# Patient Record
Sex: FEMALE | Race: WHITE | Employment: OTHER | URBAN - NONMETROPOLITAN AREA
[De-identification: names, ages, dates, MRNs, and addresses within clinical notes are randomized per-mention and may not be internally consistent; named-entity substitution may affect disease eponyms.]

---

## 2019-05-15 ENCOUNTER — HOSPITAL ENCOUNTER (OUTPATIENT)
Dept: WOMENS IMAGING | Age: 74
Discharge: HOME | End: 2019-05-15

## 2019-05-15 DIAGNOSIS — Z00.6 ENCOUNTER FOR EXAMINATION FOR NORMAL COMPARISON OR CONTROL IN CLINICAL RESEARCH PROGRAM: ICD-10-CM

## 2019-05-15 PROCEDURE — 3209999900 MAM COMPARISON OF OUTSIDE IMAGES

## 2019-05-20 ENCOUNTER — HOSPITAL ENCOUNTER (OUTPATIENT)
Dept: WOMENS IMAGING | Age: 74
Discharge: HOME | End: 2019-05-20

## 2019-05-20 DIAGNOSIS — Z00.6 ENCOUNTER FOR EXAMINATION FOR NORMAL COMPARISON OR CONTROL IN CLINICAL RESEARCH PROGRAM: ICD-10-CM

## 2019-05-20 PROCEDURE — 3209999900 MAM COMPARISON OF OUTSIDE IMAGES

## 2019-05-23 ENCOUNTER — HOSPITAL ENCOUNTER (OUTPATIENT)
Dept: WOMENS IMAGING | Age: 74
Discharge: HOME | End: 2019-05-23
Payer: MEDICARE

## 2019-05-23 DIAGNOSIS — N63.0 BREAST MASS: ICD-10-CM

## 2019-05-23 DIAGNOSIS — R92.2 BREAST DENSITY: ICD-10-CM

## 2019-05-23 PROCEDURE — 88305 TISSUE EXAM BY PATHOLOGIST: CPT

## 2019-05-23 PROCEDURE — 2709999900 HC NON-CHARGEABLE SUPPLY

## 2019-05-23 PROCEDURE — 76642 ULTRASOUND BREAST LIMITED: CPT

## 2019-05-23 PROCEDURE — A4648 IMPLANTABLE TISSUE MARKER: HCPCS

## 2019-05-23 PROCEDURE — C1894 INTRO/SHEATH, NON-LASER: HCPCS

## 2019-05-23 PROCEDURE — 88360 TUMOR IMMUNOHISTOCHEM/MANUAL: CPT

## 2019-05-23 PROCEDURE — 19084 BX BREAST ADD LESION US IMAG: CPT

## 2019-05-23 PROCEDURE — 19083 BX BREAST 1ST LESION US IMAG: CPT

## 2019-05-23 PROCEDURE — 88341 IMHCHEM/IMCYTCHM EA ADD ANTB: CPT

## 2019-05-23 PROCEDURE — 88377 M/PHMTRC ALYS ISHQUANT/SEMIQ: CPT

## 2019-05-23 PROCEDURE — 77066 DX MAMMO INCL CAD BI: CPT

## 2019-05-23 PROCEDURE — 88342 IMHCHEM/IMCYTCHM 1ST ANTB: CPT

## 2019-05-23 RX ORDER — MONTELUKAST SODIUM 10 MG/1
10 TABLET ORAL NIGHTLY
COMMUNITY
End: 2021-06-24 | Stop reason: ALTCHOICE

## 2019-05-23 RX ORDER — AMLODIPINE BESYLATE 5 MG/1
7.5 TABLET ORAL DAILY
COMMUNITY

## 2019-05-23 RX ORDER — FEXOFENADINE HCL 180 MG/1
180 TABLET ORAL DAILY
COMMUNITY
End: 2020-12-17

## 2019-05-23 RX ORDER — AZELASTINE 1 MG/ML
1 SPRAY, METERED NASAL 2 TIMES DAILY
COMMUNITY

## 2019-05-23 RX ORDER — M-VIT,TX,IRON,MINS/CALC/FOLIC 27MG-0.4MG
1 TABLET ORAL DAILY
COMMUNITY

## 2019-05-23 RX ORDER — METOPROLOL SUCCINATE 50 MG/1
50 TABLET, EXTENDED RELEASE ORAL DAILY
COMMUNITY

## 2019-05-23 RX ORDER — FAMOTIDINE 20 MG/1
20 TABLET, FILM COATED ORAL 2 TIMES DAILY
COMMUNITY

## 2019-05-23 NOTE — PROGRESS NOTES
[facility] [address]  Pre-Biopsy Assessment      Patient Education    Written information about procedure Yes  left   Procedural steps explained Yes Ultrasound Biopsy   Post-op potential: bruising, hematoma, pain Yes    Self-care: activity, care of dressing Yes    Patient verbalized understanding Yes    Consent signed and witnessed Yes      Hormone Therapy Status:  none    Recent Medication: N/A Last Dose: na                                     Hormone Replacement Therapy: Yes, took for 3 years with menopause.   Stopped at age 53    Previous Breast Biopsy: no    Previous Diagnosis Cancer: no    Hysterectomy:no    Emotional Status: Nervous    Language or Physical Barriers: none    Comments: none      Electronically signed by Carmen Forrest RN on 5/23/2019 at 1:09 PM

## 2019-05-23 NOTE — PROGRESS NOTES
Breast Biopsy Flowsheet/Post-Operative Care    Date of Procedure: 5/23/2019  Physician: Dr. Chin Reese  Technologist: Kim Mera RT(R)(M)    Biopsy:ultrasound guided breast biopsy  Lesion type: Non-palpable  Breast: bilateral    Clock face position: Site #1: Left breast lower outer quadrant     Site #2: Left breast upper outer quadrant, middle depth     Site #3 Right breast lower outer quadrant, middle depth    Primary Method of Detection: Ultrasound      Microcalcification's: no   Distribution: N/A    Asymmetry: NA    Biopsy Method:   Sertera:    Site # 1    Gauge: 14    # of Passes: 4     Clip: Mason    Sertera:    Site # 2    Gauge: 14    # of Passes: 4     Clip:  Dara Goodell:    Site # 3    Gauge: 14    # of Passes: 4     Clip: Jerel    Pre-Op Assessment: (BI-RADS)   4. Suspicious Abnormality    Patient Tolerated Procedure: good  Complications: none  Comments: Patient anxious and emotional throughout procedure     Post Operative Care  Steri strips: Yes  Dressing: Gauze, Tape   Ice Applied to Site:  Yes  Evidence of Bleeding:  No    Pain Verbalized: Yes      Written Discharge Instructions: Yes  Condition at Discharge: good  Time of Discharge: [address] [place] [address]    Electronically signed by Karen Vila RN on 5/23/2019 at [phone] PM

## 2019-05-28 ENCOUNTER — CLINICAL DOCUMENTATION (OUTPATIENT)
Dept: WOMENS IMAGING | Age: 74
End: 2019-05-28

## 2019-05-28 NOTE — PROGRESS NOTES
Name: Ailyn Norman  : [birth year]  MRN: [id number]    Oncology Navigation- Initial Note:    Intake-  Contact [facility]: [facility]    Diagnosis: Breast-malignant    Home Disposition: Lives with other who is able to assist, spouse    Patient needs and barriers to care: Coordination of Care, Knowledge deficit and Emotional Issues/ Fear/ Anxiety     Referral Source: Outside Provider    Receptive to Advanced Care Planning/ Palliative Care:  N/A, not clinical stage 3 or 4    Interventions-   General Interventions: Arrived with spouse for pathology results. Dr. Faina Amaya explained results and recommended she attend Breast Clinic 2019. Education/Screenings:  yes - Breast cancer information packet, navigation, breast clinic, arm measurements     Currently on HRT: no     Referrals: Breast Clinic: 2019 at 7:30 am, okay with Iris Whitehead contacting her for support. Biopsy site status: Faint bruising at left sites x 2, right site superficial skin tear, dime size, no drainage. Instructed to use triple antibiotic as needed. Some tenderness on left and instructed on ice or heat, whichever gives most comfort. Verbalizes understanding. Continuum of Care: Diagnosis/Active Treatment    Notes: Teaching completed and verbalizes understanding. Packet given for home. Does not meet criteria for genetic evaluation. Anxious at times, emotional support offered. All questions answered. States she does not know any surgeons here, all cards given. Will continue to follow through continuum of care.      Electronically signed by Christopher Medellin RN on 2019 at 2:05 PM

## 2019-05-31 ENCOUNTER — CLINICAL DOCUMENTATION (OUTPATIENT)
Dept: WOMENS IMAGING | Age: 74
End: 2019-05-31

## 2019-05-31 ENCOUNTER — CLINICAL DOCUMENTATION (OUTPATIENT)
Dept: PHYSICAL THERAPY | Age: 74
End: 2019-05-31

## 2019-05-31 NOTE — PROGRESS NOTES
Name: Carolyn Willis  : 1945  MRN: [id number]    Oncology Navigation Follow-Up Note    Contact Type:  Breast Clinic    Subjective: Arrived with spouse Jass Knows to meet with Breast Team. Recommendation sheet located under media tab. Objective: Breast Team discussed recommendations and answered questions. Patient appears nervous and expressed that she just \"wants this done as soon as possible. \" Requests referrals to Dr. Kenia Mendiola and Dr. Reema Alexander, wants appointments in [facility] when possible. Okay to be seen in [facility] while Dr. Reema Alexander is off as needed. Assistance Needed: Denies needs at this time. Receptive to Advanced Care Planning / Palliative Care:  N/A, clinical stage 1A     Referrals: Dr. Kenia Mendiola: 6/3/2019 at 9:30 am per Jaclyn Cabrera, . Referrals called to Anastacia Ortega,  for Dr. Reema Alexander in [facility]. Office to call patient with appointment. Patient is aware. Dr. Kenia Mendiola will refer to XRT when appropiate. Education: Breast Clinic Recommendations sheet, reviewed with patient and copy given for home. Care Coordination    Notes: Teaching completed and verbalizes understanding. Emotional support offered. Encouraged her to call anytime with questions. Arm measurements completed by Jay Guerrero, PT assistant. Will continue to follow through continuum of care.     Electronically signed by Kasey Knox RN on 2019 at 8:07 AM

## 2019-05-31 NOTE — PROGRESS NOTES
[facility]  OUTPATIENT REHABILITATION  PHYSICAL THERAPY  INTEGRATED BREAST CANCER CLINIC SCREEN      Date: 2019  Patient Name: Elliot Cardona        CSN: [id number]   : 1945  (75 y.o.)  Gender: female     Location Left   Type IDC   Hormone Type ER+  MA+   HER2-steven  Non-Amplified       Strength/ROM Right Left   Shoulder Flexion PROM 173 180   Shoulder Abduction PROM 180 185   Shoulder Strength 4+ 4+   Elbow Strength 4+ 4+   Hand Dominance R        Location Right (cm) Left (cm)   Met Heads 19.3 18.2   Ulnar Styloid Process 16 15.8   10 cm distal to Lat. Epicondyle 23.5 23.2   Antecubital Fossa 25 24.5   10 cm proximal to Lat.  Epicondyle 34 32   Axilla 35.8 36   Total 153.6 149.7     [facility] PTA [address]

## 2019-06-03 ENCOUNTER — HOSPITAL ENCOUNTER (OUTPATIENT)
Age: 74
Discharge: HOME | End: 2019-06-03
Payer: MEDICARE

## 2019-06-03 ENCOUNTER — OFFICE VISIT (OUTPATIENT)
Dept: SURGERY | Age: 74
End: 2019-06-03
Payer: MEDICARE

## 2019-06-03 VITALS
BODY MASS INDEX: 34.81 KG/M2 | SYSTOLIC BLOOD PRESSURE: 124 MMHG | HEIGHT: 61 IN | OXYGEN SATURATION: 97 % | WEIGHT: 184.4 LBS | TEMPERATURE: 97.3 F | DIASTOLIC BLOOD PRESSURE: 86 MMHG | RESPIRATION RATE: 18 BRPM | HEART RATE: 88 BPM

## 2019-06-03 DIAGNOSIS — Z17.0 BREAST CANCER, STAGE 1, ESTROGEN RECEPTOR POSITIVE, LEFT (HCC): ICD-10-CM

## 2019-06-03 DIAGNOSIS — Z01.818 PRE-OP TESTING: ICD-10-CM

## 2019-06-03 DIAGNOSIS — I10 ESSENTIAL HYPERTENSION: ICD-10-CM

## 2019-06-03 DIAGNOSIS — C50.912 BREAST CANCER, STAGE 1, ESTROGEN RECEPTOR POSITIVE, LEFT (HCC): Primary | ICD-10-CM

## 2019-06-03 DIAGNOSIS — Z17.0 BREAST CANCER, STAGE 1, ESTROGEN RECEPTOR POSITIVE, LEFT (HCC): Primary | ICD-10-CM

## 2019-06-03 DIAGNOSIS — C50.912 BREAST CANCER, STAGE 1, ESTROGEN RECEPTOR POSITIVE, LEFT (HCC): ICD-10-CM

## 2019-06-03 LAB
ANION GAP SERPL CALCULATED.3IONS-SCNC: 14 MEQ/L (ref 8–16)
BASOPHILS # BLD: 0.3 %
BASOPHILS ABSOLUTE: 0 THOU/MM3 (ref 0–0.1)
BUN BLDV-MCNC: 12 MG/DL (ref 7–22)
CALCIUM SERPL-MCNC: 9.5 MG/DL (ref 8.5–10.5)
CHLORIDE BLD-SCNC: 105 MEQ/L (ref 98–111)
CO2: 24 MEQ/L (ref 23–33)
CREAT SERPL-MCNC: 0.7 MG/DL (ref 0.4–1.2)
EKG ATRIAL RATE: 65 BPM
EKG P AXIS: 71 DEGREES
EKG P-R INTERVAL: 182 MS
EKG Q-T INTERVAL: 444 MS
EKG QRS DURATION: 98 MS
EKG QTC CALCULATION (BAZETT): 461 MS
EKG R AXIS: 56 DEGREES
EKG T AXIS: 55 DEGREES
EKG VENTRICULAR RATE: 65 BPM
EOSINOPHIL # BLD: 3.1 %
EOSINOPHILS ABSOLUTE: 0.2 THOU/MM3 (ref 0–0.4)
ERYTHROCYTE [DISTWIDTH] IN BLOOD BY AUTOMATED COUNT: 12.9 % (ref 11.5–14.5)
ERYTHROCYTE [DISTWIDTH] IN BLOOD BY AUTOMATED COUNT: 48.5 FL (ref 35–45)
GFR SERPL CREATININE-BSD FRML MDRD: 82 ML/MIN/1.73M2
GLUCOSE BLD-MCNC: 111 MG/DL (ref 70–108)
HCT VFR BLD CALC: 39.2 % (ref 37–47)
HEMOGLOBIN: 13 GM/DL (ref 12–16)
IMMATURE GRANS (ABS): 0.02 THOU/MM3 (ref 0–0.07)
IMMATURE GRANULOCYTES: 0.3 %
LYMPHOCYTES # BLD: 25.7 %
LYMPHOCYTES ABSOLUTE: 1.6 THOU/MM3 (ref 1–4.8)
MCH RBC QN AUTO: 34.2 PG (ref 26–33)
MCHC RBC AUTO-ENTMCNC: 33.2 GM/DL (ref 32.2–35.5)
MCV RBC AUTO: 103.2 FL (ref 81–99)
MONOCYTES # BLD: 10.1 %
MONOCYTES ABSOLUTE: 0.6 THOU/MM3 (ref 0.4–1.3)
NUCLEATED RED BLOOD CELLS: 0 /100 WBC
PLATELET # BLD: 249 THOU/MM3 (ref 130–400)
PMV BLD AUTO: 8.9 FL (ref 9.4–12.4)
POTASSIUM SERPL-SCNC: 4.3 MEQ/L (ref 3.5–5.2)
RBC # BLD: 3.8 MILL/MM3 (ref 4.2–5.4)
SEG NEUTROPHILS: 60.5 %
SEGMENTED NEUTROPHILS ABSOLUTE COUNT: 3.8 THOU/MM3 (ref 1.8–7.7)
SODIUM BLD-SCNC: 143 MEQ/L (ref 135–145)
WBC # BLD: 6.2 THOU/MM3 (ref 4.8–10.8)

## 2019-06-03 PROCEDURE — G8427 DOCREV CUR MEDS BY ELIG CLIN: HCPCS | Performed by: SURGERY

## 2019-06-03 PROCEDURE — 93010 ELECTROCARDIOGRAM REPORT: CPT | Performed by: NUCLEAR MEDICINE

## 2019-06-03 PROCEDURE — 3017F COLORECTAL CA SCREEN DOC REV: CPT | Performed by: SURGERY

## 2019-06-03 PROCEDURE — 36415 COLL VENOUS BLD VENIPUNCTURE: CPT

## 2019-06-03 PROCEDURE — 1090F PRES/ABSN URINE INCON ASSESS: CPT | Performed by: SURGERY

## 2019-06-03 PROCEDURE — G8417 CALC BMI ABV UP PARAM F/U: HCPCS | Performed by: SURGERY

## 2019-06-03 PROCEDURE — 4040F PNEUMOC VAC/ADMIN/RCVD: CPT | Performed by: SURGERY

## 2019-06-03 PROCEDURE — 1123F ACP DISCUSS/DSCN MKR DOCD: CPT | Performed by: SURGERY

## 2019-06-03 PROCEDURE — G8400 PT W/DXA NO RESULTS DOC: HCPCS | Performed by: SURGERY

## 2019-06-03 PROCEDURE — 93005 ELECTROCARDIOGRAM TRACING: CPT

## 2019-06-03 PROCEDURE — 80048 BASIC METABOLIC PNL TOTAL CA: CPT

## 2019-06-03 PROCEDURE — 1036F TOBACCO NON-USER: CPT | Performed by: SURGERY

## 2019-06-03 PROCEDURE — 85025 COMPLETE CBC W/AUTO DIFF WBC: CPT

## 2019-06-03 PROCEDURE — 99204 OFFICE O/P NEW MOD 45 MIN: CPT | Performed by: SURGERY

## 2019-06-03 ASSESSMENT — ENCOUNTER SYMPTOMS
TROUBLE SWALLOWING: 0
WHEEZING: 0
ABDOMINAL PAIN: 0
NAUSEA: 0
BLOOD IN STOOL: 0
SORE THROAT: 0
VOICE CHANGE: 0
SHORTNESS OF BREATH: 0
VOMITING: 0
COLOR CHANGE: 0
COUGH: 0

## 2019-06-03 NOTE — PATIENT INSTRUCTIONS
Patient Education        Lumpectomy: Before Your Surgery  What is a lumpectomy? A lumpectomy is surgery to remove cancer from the breast. Your doctor will make a small cut (incision) and take out the cancer. The whole breast will not be removed. The doctor will try to also take a small amount of normal tissue around the cancer. This is known as \"getting clear margins. \" Some people will need another surgery to be sure the margins are clear. The doctor may also check the nearby lymph nodes during the surgery. After a lumpectomy, you will probably go home the same day. Most people can go back to work or their normal routine in 1 to 3 weeks. This depends on how you feel. It also depends on the type of work you do and whether you need more treatment. This may include radiation or chemotherapy. Most people who have a lumpectomy for cancer also get radiation treatment. The surgery will leave scars. Sometimes it leaves a dent in the breast too. Most women will look normal in a bra. But your breasts may not match in size or shape after surgery. This depends on the size of your breasts. It also depends on how much tissue was removed. When you find out that you have cancer, you may feel many emotions and may need some help coping. Seek out family, friends, and counselors for support. You also can do things at home to make yourself feel better while you go through treatment. Call the [facility] ([phone]) or visit its website at [address] [employer] for more information. Follow-up care is a key part of your treatment and safety. Be sure to make and go to all appointments, and call your doctor if you are having problems. It's also a good idea to know your test results and keep a list of the medicines you take. What happens before surgery?   Surgery can be stressful. This information will help you understand what you can expect.  And it will help you safely prepare for surgery.   Preparing for surgery

## 2019-06-03 NOTE — PROGRESS NOTES
Esther Caruso MD   General Surgery  New Patient Evaluation in Office  Pt Name: Darleen Abrams  Date of Birth 1945   Today's Date: 6/3/2019  Medical Record Number: [id number]  Referring Provider: Felicita Garcia MD  Primary Care Provider: Adalgisa Alejo MD  Chief Complaint:  Chief Complaint   Patient presents with   [facility] Surgical Consult     new patient- invasive ductal carcinoma left breast       ASSESSMENT      1. Breast cancer, stage 1, estrogen receptor positive, left ([address]    2. Essential hypertension    3. Pre-op testing    Clinical stage IA     PLANS      1. Schedule patient for left partial mastectomy with preoperative needle localization  2. Preoperative testing per anesthesia guidelines  3. MAC anesthesia  4. Surgical options breast conservation as well as mastectomy with and without reconstruction were discussed. Potential need for radiation therapy post breast conservation was discussed. She may be a candidate for withholding radiation therapy as she is 75years old. Also discussed she is a good candidate to with hold sentinel lymph node biopsy. She wishes to proceed with left partial mastectomy. 5.  Risks of procedure including but limited to: Bleeding, infection, need for reexcision of margins scar formation were all discussed. All questions were answered. Juan Davis is a 75y.o. year old female who is presenting today in the office for invasive ductal carcinoma of the left breast.  She was seen as well at multidisciplinary breast clinic where her imaging and case were reviewed and all her questions were answered. She originally presented for screening imaging at an outside facility. She was referred to [facility]. [facility] for breast ultrasound and biopsies. 3-D breast imaging revealed an 8 mm density in the lower aspect of the left breast confirmed on ultrasound. She was referred for biopsy.   Imaging was reviewed here she ultimately underwent ultrasound of both breasts and ultrasound guided biopsies of densities in both breasts. She had a mass in the lower outer quadrant of the right breast and an ultrasound-guided core biopsy was completed. She had 2 densities in the left breast 1 in the upper outer quadrant and one at the 5:30 position of the lower outer quadrant. The right breast mass and lesion left breast at 2:00 were benign without any atypia. The mass at 5:30 were about barbell clip was placed showed invasive ductal carcinoma [place] grade 2. Receptors were estrogen receptor +100% progesterone receptor +75% and HER-2 not amplified. Ultrasound imaging showed no suspicious masses within the axilla. Lymph nodes visualized appeared normal.  Patient has had no breast symptoms. She does have some mild bruising post biopsy. Surgical options were discussed patient at length in the office. She wishes to proceed with breast conservation. With age over 78 and sepsis opinion she is a good candidate to eliminate sentinel node biopsy. . She had her children between the ages of 20 and 27. She took oral contraceptive pills briefly for about 3 years. No family history of breast or ovarian malignancy. Brother history of colon cancer. She denies prior breast biopsies.     Past Medical History  Past Medical History:   Diagnosis Date    Diverticulitis     GERD (gastroesophageal reflux disease)     Hypertension     IBS (irritable bowel syndrome)     Invasive ductal carcinoma of breast ([address]     left       Past Surgical History  Past Surgical History:   Procedure Laterality Date    [facility]    CARDIAC CATHETERIZATION  10/19/2007    [facility]   [place]    Dr Jovani Diaz in [facility] [address]    COLONOSCOPY  2017    Dr Juan Gonzales Bilateral [address] [place] [address] Medications  Current Outpatient Medications   Medication Sig Dispense Refill    amLODIPine (NORVASC) 5 MG tablet Take 5 mg by mouth daily      azelastine (ASTELIN) 0.1 % nasal spray 1 spray by Nasal route 2 times daily Use in each nostril as directed      famotidine (PEPCID) 20 MG tablet Take 20 mg by mouth 2 times daily      fexofenadine (HM FEXOFENADINE HCL) 180 MG tablet Take 180 mg by mouth daily      metoprolol succinate (TOPROL XL) 50 MG extended release tablet Take 50 mg by mouth daily      Multiple Vitamins-Minerals (THERAPEUTIC MULTIVITAMIN-MINERALS) tablet Take 1 tablet by mouth daily      montelukast (SINGULAIR) 10 MG tablet Take 10 mg by mouth nightly       No current facility-administered medications for this visit.       Allergies     Allergies   Allergen Reactions    Aspirin Hives    Ceftin [Cefuroxime Axetil] Rash    Codeine Rash    Nexium [Esomeprazole] Diarrhea    Penicillins Rash    Reglan [Metoclopramide] Diarrhea       Family History  Family History   Problem Relation Age of Onset    Colon Cancer Brother     Lung Cancer Brother     Depression Mother     Stroke Father     Heart Disease Sister     No Known Problems Maternal Grandmother     No Known Problems Maternal Grandfather     No Known Problems Paternal Grandmother     No Known Problems Paternal Grandfather     Breast Cancer Neg Hx     Cancer Neg Hx        SocialHistory  Social History     Socioeconomic History    Marital status:      Spouse name: Not on file    Number of children: 6    Years of education: Not on file    Highest education level: Not on file   Occupational History    Occupation: retired   Social Needs    Financial resource strain: Not on file    Food insecurity:     Worry: Not on file     Inability: Not on file   [employer] needs:     Medical: Not on file     Non-medical: Not on file   Tobacco Use    Smoking status: Never Smoker    Smokeless tobacco: Never Used   Substance and Sexual Activity    Alcohol use: Yes     Comment: glass wine nightly    Drug use: Never    Sexual activity: Not on file   Lifestyle    Physical activity:     Days per week: Not on file     Minutes per session: Not on file    Stress: Not on file   Relationships    Social connections:     Talks on phone: Not on file     Gets together: Not on file     Attends [employer] service: Not on file     Active member of club or organization: Not on file     Attends meetings of clubs or organizations: Not on file     Relationship status: Not on file    Intimate partner violence:     Fear of current or ex partner: Not on file     Emotionally abused: Not on file     Physically abused: Not on file     Forced sexual activity: Not on file   Other Topics Concern    Not on file   Social History Narrative    Not on file           Review of Systems  Review of Systems   Constitutional: Negative for chills, fatigue, fever and unexpected weight change. HENT: Negative for sore throat, trouble swallowing and voice change. Eyes: Negative for visual disturbance. Respiratory: Negative for cough, shortness of breath and wheezing. Cardiovascular: Negative for chest pain and palpitations. Gastrointestinal: Negative for abdominal pain, blood in stool, nausea and vomiting. Endocrine: Positive for cold intolerance. Negative for heat intolerance and polydipsia. Genitourinary: Positive for urgency. Negative for dysuria, flank pain and hematuria. Musculoskeletal: Negative for gait problem, joint swelling and myalgias. Skin: Negative for color change and rash. Allergic/Immunologic: Positive for environmental allergies and food allergies. Negative for immunocompromised state. Neurological: Negative for dizziness, tremors, seizures and speech difficulty. Hematological: Does not bruise/bleed easily. Psychiatric/Behavioral: Negative for behavioral problems, confusion and suicidal ideas.        OBJECTIVE     /86 (Site: reviewed. Lab Results   Component Value Date    WBC 6.2 2019    HGB 13.0 2019    HCT 39.2 2019     2019     2019    K 4.3 2019     2019    CREATININE 0.7 2019    BUN 12 2019    CO2 24 2019       Performed by: [facility]     [address]               [id number]  Assoc.                                              Page 1 of 1  [address]  [place], [place] [address]                                                      PROC: 2019  [facility]                                    RECV: 2019  [address]                                    RPTD: 2019  [place], [place] [address]                      MRN:  [id number]    LOC: [facility]                      ACCT: [de-identified]  SEX: F                      : 1945  AGE: 73 Y                         PATHOLOGY REPORT                      ATTN: TARA NICHOLS                      REQ: TARA NICHOLS      Copies To:   DANA DISLA      Clinical Information: LEFT BREAST MASS X 2, RIGHT BREAST MASS    FINAL DIAGNOSIS:  Addendum Report (2019):  A. Right breast mass, lower outer, core needle biopsy with tribell clip  placement:      Fibrocystic changes including stromal fibrosis, usual ductal      hyperplasia, columnar cell change, and microcysts. B. Left breast mass #2, 2:00, upper outer, core needle biopsy with  tribell clip placement:   Apocrine cysts.     Background fibrocystic changes including stromal fibrosis, apocrine      metaplasia, and microcysts. C. Left breast mass, 5:30, lower outer, core needle biopsy with barbell  clip placement:   Invasive ductal carcinoma, Fernando grade 2.     BREAST BIOMARKERS*  Estrogen Receptor: (Clone SP1), [facility] [employer]   Positive 100% of cells  Intensity of Staining:   Strong    Progesterone Receptor: (Clone 1E2), [facility]   Positive 75% of cells   Intensity of Staining:     neutral buffered formalin  Tissue removal time: 142  Tissue fixation time: 142  Total fixation time: 29 hours 35 minutes    C - The container is labeled Frederick Shah, left lower outer,  [place], 5:30.  Received in formalin are three cylindrical fragments of  yellow-white tissue each about 0.1 cm in diameter x 1 cm in length. The specimen is entirely submitted.  1 ns.    SIO/DKR:jcs    Fixative:  10% neutral buffered formalin  Tissue removal time: 1410  Tissue fixation time: 141  Total fixation time:   29 hours 49 minutes    Microscopic Examination:  A.  Sections show fibroglandular breast tissue with fibrocystic changes  including usual ductal hyperplasia, stromal fibrosis, and microcysts. There is no evidence of malignancy. [place] show fibrous glandular breast tissue with stromal  fibrosis, apocrine cysts, and chronic inflammation.  There is no  evidence of epithelial atypia or malignancy. [place] show infiltrative epithelial cells that are cohesive in  nature.  Rare cords of cells are noted.  The background stroma shows a  marked desmoplastic response.  To exclude the possibility of a  component of DCIS, a p63 immunohistochemical stain is performed, with  appropriate controls. Dai  is no expression of p63, excluding this  possibility.  An E-cadherin immunohistochemical stain is performed,  with appropriate controls, to exclude the possibility of a lobular  component.  There is diffuse expression of E-cadherin, consistent with  the above diagnosis. Procedure: Needle biopsy  Specimen laterality: Left  Tumor site: Lower outer, 5:30  Histologic type:  Invasive carcinoma type  Histologic grade, [place] histologic score  Glandular/tubular differentiation: Score 3  Nuclear pleomorphism: Score 2  Mitotic rate: Score 1  Overall grade: Grade 2  Ductal carcinoma in situ (DCIS): Not identified  Lymphovascular invasion: Not identified  Biomarker studies: Pending    [id number]  [id number]  [id number]  [id number]  [id number]                                                    <Sign Out Dr. Karey Godfrey M.D., F.C. A. P      [facility]/ [address] [address]  Printed on:  5/29/2019  [address]  [facility], [address]  Original print date: 05/29/2019              IMPRESSION: Ultrasound BI-RADS: 4A: Low Suspicion for Malignancy   The 1 cm x 0.5 cm x 0.9 cm lobulated lesion in the left breast    appears to have a low suspicion for malignancy.  An ultrasound    guided biopsy is recommended.     The nurse navigator was notified. [address] results were reviewed with    the patient.         The biopsy is scheduled for today.    #[id number] - US BREAST LIMITED LEFT   ULTRASOUND OF LEFT BREAST AND LEFT AXILLA: 5/23/2019   CLINICAL: Lt breast density.         Comparison is made to exams dated:  5/14/2019 mammogram,    5/14/2019 ultrasound, 5/9/2019 ultrasound, and 3/22/2018    ultrasound - [address] [place] [address].     Real-time ultrasound of the left breast and axilla was performed    on the areas of interest.  Gray scale images of the real-time    examination were reviewed.     There is a 1 cm x 0.5 cm x 0.9 cm lobulated lesion with a    circumscribed margin in the left breast upper outer aspect middle    depth 4 cm from the nipple.  This lobulated lesion is of mixed    echogenicity.  This correlates with mammography findings.     No abnormalities were seen sonographically in the left axilla.         Dr. Merlyn Perry     nn/:5/23/2019 14:36:02         Imaging Technologist: Justine Haque RT(R)(M), [facility]          [id number]                IMPRESSION: Ultrasound BI-RADS: 4A: Low Suspicion for Malignancy   The 0.6 cm x 0.7 cm irregular lesion in the right breast appears    to have a low suspicion for malignancy.  An ultrasound guided    biopsy is recommended.     The nurse navigator was notified. [address] [address] results were reviewed with

## 2019-06-03 NOTE — LETTER
[facility]  [facility] [address] [place] [address] [address]  [address]  Phone: [phone]  Fax: [phone]    Demar Dominguez MD        June 6, 2019    Karl Cabrales [address] [address]    Patient: Mary Wood   MR Number: [id number]   YOB: 1945   Date of Visit: 6/3/2019     Dear Karl Cabrales,    Thank you for the request for consultation for Mary Wood to me for the evaluation of invasive ductal carcinoma left breast. Below are the relevant portions of my assessment and plan of care. 1. Breast cancer, stage 1, estrogen receptor positive, left ([address]    2. Essential hypertension    3. Pre-op testing    Clinical stage IA       1. Schedule patient for left partial mastectomy with preoperative needle localization  2. Preoperative testing per anesthesia guidelines  3. MAC anesthesia  4. Surgical options breast conservation as well as mastectomy with and without reconstruction were discussed. Potential need for radiation therapy post breast conservation was discussed. She may be a candidate for withholding radiation therapy as she is 75years old. Also discussed she is a good candidate to with hold sentinel lymph node biopsy. She wishes to proceed with left partial mastectomy. 5.  Risks of procedure including but limited to: Bleeding, infection, need for reexcision of margins scar formation were all discussed. All questions were answered. If you have questions, please do not hesitate to call me. I look forward to following Maulik Salvador along with you.     Sincerely,          Demar Dominguez MD

## 2019-06-11 ENCOUNTER — HOSPITAL ENCOUNTER (OUTPATIENT)
Dept: PHYSICAL THERAPY | Age: 74
Setting detail: THERAPIES SERIES
Discharge: HOME | End: 2019-06-11
Payer: MEDICARE

## 2019-06-11 PROCEDURE — 97161 PT EVAL LOW COMPLEX 20 MIN: CPT

## 2019-06-11 PROCEDURE — 97110 THERAPEUTIC EXERCISES: CPT

## 2019-06-11 ASSESSMENT — PAIN SCALES - GENERAL: PAINLEVEL_OUTOF10: 2

## 2019-06-11 NOTE — PROGRESS NOTES
to enter without handrails. Task Previous Current   ADLs  Independent Independent   Homemaking Independent Independent   Ambulation Independent Independent    Transfers Independent Independent    Driving Independent Independent   Work Independent Independent with volunteer work for [employer] - enjoys reading and sewing, would like to return to working   [place] Independent Independent        OBJECTIVE:   Posture: Depressed and protracted shoulders  Palpation: Mild tenderness at left breast biopsy site  Observation: No bruising noted on left. Range of Motion/Strength (Range of Motion in degrees)    Right Left Comments   Shoulder Flexion PROM 180 180    Shoulder ABDuction PROM 180 180    Shoulder Strength 4+/5 4+/5    Elbow Strength 5/5 5/5      Circumferential Measurements:   Upper Extremity Circumferential Measurements    Right (cm) Left (cm) Comments   Met Heads 19.2 18.4    Ulnar Styloid Process 15.5 15.5    10 cm distal to Lateral Epicondyle 23.3 22.5    Elbow Crease 24 24.5    10 cm proximal from Lateral Epicondyle 30 30.2    Axilla 33.5 35    Total 145.5 146.1       5/31/19:  153.6 149.7    Brief Fatigue Inventory: 0.6 (0: none, 1-3: mild, 4-6: moderate, 7-10: severe)  Quick Dash: 13.6% impaired     Activity Tolerance: Patient tolerated treatment well    Treatment Initiated: Educated to exercises to begin 1 week after surgery pending surgeon's release: scapular retractions, wall walks, and pendulums for left/right, clockwise and counterclockwise. Provided with handout and instructed to focus on 1st 2 exercises if doing well until therapy follow up visit but if having more soreness and/or stiffness, include pendulums to help allow movement without pain or aggravation and prevent glenohumeral capsular restriction.      ASSESSMENT:  Problem List:Pain and Impaired posture    Assessment: Pt presents prior to L breast lumpectomy with left breast

## 2019-06-12 ENCOUNTER — ANESTHESIA (OUTPATIENT)
Dept: OPERATING ROOM | Age: 74
End: 2019-06-12
Payer: MEDICARE

## 2019-06-12 ENCOUNTER — ANESTHESIA EVENT (OUTPATIENT)
Dept: OPERATING ROOM | Age: 74
End: 2019-06-12
Payer: MEDICARE

## 2019-06-12 ENCOUNTER — HOSPITAL ENCOUNTER (OUTPATIENT)
Dept: WOMENS IMAGING | Age: 74
Discharge: HOME | End: 2019-06-12
Payer: MEDICARE

## 2019-06-12 ENCOUNTER — HOSPITAL ENCOUNTER (OUTPATIENT)
Dept: WOMENS IMAGING | Age: 74
Discharge: HOME | End: 2019-06-12
Attending: SURGERY
Payer: MEDICARE

## 2019-06-12 ENCOUNTER — HOSPITAL ENCOUNTER (OUTPATIENT)
Age: 74
Setting detail: OUTPATIENT SURGERY
Discharge: HOME | End: 2019-06-12
Attending: SURGERY | Admitting: SURGERY
Payer: MEDICARE

## 2019-06-12 VITALS
SYSTOLIC BLOOD PRESSURE: 105 MMHG | DIASTOLIC BLOOD PRESSURE: 57 MMHG | RESPIRATION RATE: 9 BRPM | OXYGEN SATURATION: 99 %

## 2019-06-12 VITALS
HEIGHT: 61 IN | SYSTOLIC BLOOD PRESSURE: 119 MMHG | WEIGHT: 184.6 LBS | BODY MASS INDEX: 34.85 KG/M2 | RESPIRATION RATE: 16 BRPM | OXYGEN SATURATION: 95 % | HEART RATE: 85 BPM | TEMPERATURE: 97.7 F | DIASTOLIC BLOOD PRESSURE: 57 MMHG

## 2019-06-12 DIAGNOSIS — C50.912 MALIGNANT NEOPLASM OF LEFT BREAST, STAGE 1, ESTROGEN RECEPTOR POSITIVE (HCC): ICD-10-CM

## 2019-06-12 DIAGNOSIS — Z17.0 MALIGNANT NEOPLASM OF LOWER-OUTER QUADRANT OF LEFT BREAST OF FEMALE, ESTROGEN RECEPTOR POSITIVE (HCC): Primary | ICD-10-CM

## 2019-06-12 DIAGNOSIS — Z17.0 MALIGNANT NEOPLASM OF LEFT BREAST, STAGE 1, ESTROGEN RECEPTOR POSITIVE (HCC): ICD-10-CM

## 2019-06-12 DIAGNOSIS — C50.912 BREAST CANCER, STAGE 1, ESTROGEN RECEPTOR POSITIVE, LEFT (HCC): ICD-10-CM

## 2019-06-12 DIAGNOSIS — C50.512 MALIGNANT NEOPLASM OF LOWER-OUTER QUADRANT OF LEFT BREAST OF FEMALE, ESTROGEN RECEPTOR POSITIVE (HCC): Primary | ICD-10-CM

## 2019-06-12 DIAGNOSIS — Z17.0 BREAST CANCER, STAGE 1, ESTROGEN RECEPTOR POSITIVE, LEFT (HCC): ICD-10-CM

## 2019-06-12 PROCEDURE — 6370000000 HC RX 637 (ALT 250 FOR IP): Performed by: SURGERY

## 2019-06-12 PROCEDURE — 19301 PARTIAL MASTECTOMY: CPT | Performed by: SURGERY

## 2019-06-12 PROCEDURE — 2709999900 HC NON-CHARGEABLE SUPPLY: Performed by: SURGERY

## 2019-06-12 PROCEDURE — 19285 PERQ DEV BREAST 1ST US IMAG: CPT

## 2019-06-12 PROCEDURE — 3600000013 HC SURGERY LEVEL 3 ADDTL 15MIN: Performed by: SURGERY

## 2019-06-12 PROCEDURE — 77065 DX MAMMO INCL CAD UNI: CPT

## 2019-06-12 PROCEDURE — 7100000010 HC PHASE II RECOVERY - FIRST 15 MIN: Performed by: SURGERY

## 2019-06-12 PROCEDURE — 88307 TISSUE EXAM BY PATHOLOGIST: CPT

## 2019-06-12 PROCEDURE — 7100000001 HC PACU RECOVERY - ADDTL 15 MIN: Performed by: SURGERY

## 2019-06-12 PROCEDURE — 7100000000 HC PACU RECOVERY - FIRST 15 MIN: Performed by: SURGERY

## 2019-06-12 PROCEDURE — 3700000000 HC ANESTHESIA ATTENDED CARE: Performed by: SURGERY

## 2019-06-12 PROCEDURE — C1769 GUIDE WIRE: HCPCS

## 2019-06-12 PROCEDURE — 6360000002 HC RX W HCPCS: Performed by: NURSE ANESTHETIST, CERTIFIED REGISTERED

## 2019-06-12 PROCEDURE — 3600000003 HC SURGERY LEVEL 3 BASE: Performed by: SURGERY

## 2019-06-12 PROCEDURE — 2580000003 HC RX 258: Performed by: SURGERY

## 2019-06-12 PROCEDURE — 76098 X-RAY EXAM SURGICAL SPECIMEN: CPT

## 2019-06-12 PROCEDURE — 2709999900 HC NON-CHARGEABLE SUPPLY

## 2019-06-12 PROCEDURE — 2500000003 HC RX 250 WO HCPCS: Performed by: NURSE ANESTHETIST, CERTIFIED REGISTERED

## 2019-06-12 PROCEDURE — 2500000003 HC RX 250 WO HCPCS: Performed by: SURGERY

## 2019-06-12 PROCEDURE — 3700000001 HC ADD 15 MINUTES (ANESTHESIA): Performed by: SURGERY

## 2019-06-12 PROCEDURE — 7100000011 HC PHASE II RECOVERY - ADDTL 15 MIN: Performed by: SURGERY

## 2019-06-12 RX ORDER — SODIUM CHLORIDE 0.9 % (FLUSH) 0.9 %
10 SYRINGE (ML) INJECTION EVERY 12 HOURS SCHEDULED
Status: DISCONTINUED | OUTPATIENT
Start: 2019-06-12 | End: 2019-06-12 | Stop reason: HOSPADM

## 2019-06-12 RX ORDER — DIPHENHYDRAMINE HYDROCHLORIDE 50 MG/ML
12.5 INJECTION INTRAMUSCULAR; INTRAVENOUS
Status: DISCONTINUED | OUTPATIENT
Start: 2019-06-12 | End: 2019-06-12 | Stop reason: HOSPADM

## 2019-06-12 RX ORDER — HYDROCODONE BITARTRATE AND ACETAMINOPHEN 5; 325 MG/1; MG/1
1 TABLET ORAL EVERY 4 HOURS PRN
Status: DISCONTINUED | OUTPATIENT
Start: 2019-06-12 | End: 2019-06-12 | Stop reason: HOSPADM

## 2019-06-12 RX ORDER — SODIUM CHLORIDE 9 MG/ML
INJECTION, SOLUTION INTRAVENOUS CONTINUOUS
Status: DISCONTINUED | OUTPATIENT
Start: 2019-06-12 | End: 2019-06-12 | Stop reason: HOSPADM

## 2019-06-12 RX ORDER — CLINDAMYCIN PHOSPHATE 900 MG/50ML
900 INJECTION INTRAVENOUS
Status: COMPLETED | OUTPATIENT
Start: 2019-06-12 | End: 2019-06-12

## 2019-06-12 RX ORDER — BUPIVACAINE HYDROCHLORIDE 5 MG/ML
INJECTION, SOLUTION EPIDURAL; INTRACAUDAL PRN
Status: DISCONTINUED | OUTPATIENT
Start: 2019-06-12 | End: 2019-06-12 | Stop reason: ALTCHOICE

## 2019-06-12 RX ORDER — ONDANSETRON 2 MG/ML
4 INJECTION INTRAMUSCULAR; INTRAVENOUS EVERY 6 HOURS PRN
Status: DISCONTINUED | OUTPATIENT
Start: 2019-06-12 | End: 2019-06-12 | Stop reason: HOSPADM

## 2019-06-12 RX ORDER — SODIUM CHLORIDE 0.9 % (FLUSH) 0.9 %
10 SYRINGE (ML) INJECTION PRN
Status: DISCONTINUED | OUTPATIENT
Start: 2019-06-12 | End: 2019-06-12 | Stop reason: HOSPADM

## 2019-06-12 RX ORDER — HYDROCODONE BITARTRATE AND ACETAMINOPHEN 5; 325 MG/1; MG/1
1 TABLET ORAL EVERY 6 HOURS PRN
Qty: 28 TABLET | Refills: 0 | Status: SHIPPED | OUTPATIENT
Start: 2019-06-12 | End: 2019-06-19

## 2019-06-12 RX ORDER — PROMETHAZINE HYDROCHLORIDE 25 MG/ML
25 INJECTION, SOLUTION INTRAMUSCULAR; INTRAVENOUS
Status: DISCONTINUED | OUTPATIENT
Start: 2019-06-12 | End: 2019-06-12 | Stop reason: HOSPADM

## 2019-06-12 RX ORDER — FENTANYL CITRATE 50 UG/ML
50 INJECTION, SOLUTION INTRAMUSCULAR; INTRAVENOUS EVERY 5 MIN PRN
Status: DISCONTINUED | OUTPATIENT
Start: 2019-06-12 | End: 2019-06-12 | Stop reason: HOSPADM

## 2019-06-12 RX ORDER — LIDOCAINE HYDROCHLORIDE 20 MG/ML
INJECTION, SOLUTION EPIDURAL; INFILTRATION; INTRACAUDAL; PERINEURAL PRN
Status: DISCONTINUED | OUTPATIENT
Start: 2019-06-12 | End: 2019-06-12 | Stop reason: SDUPTHER

## 2019-06-12 RX ORDER — HYDROCODONE BITARTRATE AND ACETAMINOPHEN 5; 325 MG/1; MG/1
2 TABLET ORAL EVERY 4 HOURS PRN
Status: DISCONTINUED | OUTPATIENT
Start: 2019-06-12 | End: 2019-06-12 | Stop reason: HOSPADM

## 2019-06-12 RX ORDER — MEPERIDINE HYDROCHLORIDE 25 MG/ML
12.5 INJECTION INTRAMUSCULAR; INTRAVENOUS; SUBCUTANEOUS EVERY 5 MIN PRN
Status: DISCONTINUED | OUTPATIENT
Start: 2019-06-12 | End: 2019-06-12 | Stop reason: HOSPADM

## 2019-06-12 RX ORDER — MORPHINE SULFATE 2 MG/ML
4 INJECTION, SOLUTION INTRAMUSCULAR; INTRAVENOUS
Status: DISCONTINUED | OUTPATIENT
Start: 2019-06-12 | End: 2019-06-12 | Stop reason: HOSPADM

## 2019-06-12 RX ORDER — LIDOCAINE HYDROCHLORIDE AND EPINEPHRINE 10; 10 MG/ML; UG/ML
INJECTION, SOLUTION INFILTRATION; PERINEURAL PRN
Status: DISCONTINUED | OUTPATIENT
Start: 2019-06-12 | End: 2019-06-12 | Stop reason: ALTCHOICE

## 2019-06-12 RX ORDER — FENTANYL CITRATE 50 UG/ML
25 INJECTION, SOLUTION INTRAMUSCULAR; INTRAVENOUS EVERY 5 MIN PRN
Status: DISCONTINUED | OUTPATIENT
Start: 2019-06-12 | End: 2019-06-12 | Stop reason: HOSPADM

## 2019-06-12 RX ORDER — ONDANSETRON 2 MG/ML
INJECTION INTRAMUSCULAR; INTRAVENOUS PRN
Status: DISCONTINUED | OUTPATIENT
Start: 2019-06-12 | End: 2019-06-12 | Stop reason: SDUPTHER

## 2019-06-12 RX ORDER — MORPHINE SULFATE 2 MG/ML
2 INJECTION, SOLUTION INTRAMUSCULAR; INTRAVENOUS
Status: DISCONTINUED | OUTPATIENT
Start: 2019-06-12 | End: 2019-06-12 | Stop reason: HOSPADM

## 2019-06-12 RX ORDER — ACETAMINOPHEN 325 MG/1
650 TABLET ORAL EVERY 4 HOURS PRN
Status: DISCONTINUED | OUTPATIENT
Start: 2019-06-12 | End: 2019-06-12 | Stop reason: HOSPADM

## 2019-06-12 RX ORDER — FENTANYL CITRATE 50 UG/ML
INJECTION, SOLUTION INTRAMUSCULAR; INTRAVENOUS PRN
Status: DISCONTINUED | OUTPATIENT
Start: 2019-06-12 | End: 2019-06-12 | Stop reason: SDUPTHER

## 2019-06-12 RX ORDER — EPHEDRINE SULFATE 50 MG/ML
INJECTION INTRAVENOUS PRN
Status: DISCONTINUED | OUTPATIENT
Start: 2019-06-12 | End: 2019-06-12 | Stop reason: SDUPTHER

## 2019-06-12 RX ORDER — PROPOFOL 10 MG/ML
INJECTION, EMULSION INTRAVENOUS PRN
Status: DISCONTINUED | OUTPATIENT
Start: 2019-06-12 | End: 2019-06-12 | Stop reason: SDUPTHER

## 2019-06-12 RX ADMIN — LIDOCAINE HYDROCHLORIDE 40 MG: 20 INJECTION, SOLUTION EPIDURAL; INFILTRATION; INTRACAUDAL; PERINEURAL at 09:21

## 2019-06-12 RX ADMIN — FENTANYL CITRATE 100 MCG: 50 INJECTION INTRAMUSCULAR; INTRAVENOUS at 09:19

## 2019-06-12 RX ADMIN — ONDANSETRON HYDROCHLORIDE 4 MG: 4 INJECTION, SOLUTION INTRAMUSCULAR; INTRAVENOUS at 09:32

## 2019-06-12 RX ADMIN — CLINDAMYCIN IN 5 PERCENT DEXTROSE 900 MG: 18 INJECTION, SOLUTION INTRAVENOUS at 09:24

## 2019-06-12 RX ADMIN — PROPOFOL 200 MG: 10 INJECTION, EMULSION INTRAVENOUS at 09:21

## 2019-06-12 RX ADMIN — HYDROCODONE BITARTRATE AND ACETAMINOPHEN 1 TABLET: 5; 325 TABLET ORAL at 10:46

## 2019-06-12 RX ADMIN — EPHEDRINE SULFATE 10 MG: 50 INJECTION, SOLUTION INTRAVENOUS at 09:37

## 2019-06-12 RX ADMIN — SODIUM CHLORIDE: 9 INJECTION, SOLUTION INTRAVENOUS at 09:15

## 2019-06-12 ASSESSMENT — PULMONARY FUNCTION TESTS
PIF_VALUE: 1
PIF_VALUE: 17
PIF_VALUE: 3
PIF_VALUE: 17
PIF_VALUE: 16
PIF_VALUE: 15
PIF_VALUE: 2
PIF_VALUE: 17
PIF_VALUE: 16
PIF_VALUE: 3
PIF_VALUE: 15
PIF_VALUE: 16
PIF_VALUE: 15
PIF_VALUE: 11
PIF_VALUE: 3
PIF_VALUE: 17
PIF_VALUE: 2
PIF_VALUE: 18
PIF_VALUE: 2
PIF_VALUE: 17
PIF_VALUE: 18
PIF_VALUE: 17
PIF_VALUE: 17
PIF_VALUE: 24
PIF_VALUE: 3
PIF_VALUE: 3
PIF_VALUE: 18
PIF_VALUE: 16
PIF_VALUE: 2
PIF_VALUE: 2
PIF_VALUE: 16
PIF_VALUE: 2

## 2019-06-12 ASSESSMENT — PAIN SCALES - GENERAL
PAINLEVEL_OUTOF10: 5
PAINLEVEL_OUTOF10: 0

## 2019-06-12 ASSESSMENT — LIFESTYLE VARIABLES: SMOKING_STATUS: 0

## 2019-06-12 ASSESSMENT — PAIN - FUNCTIONAL ASSESSMENT: PAIN_FUNCTIONAL_ASSESSMENT: 0-10

## 2019-06-12 ASSESSMENT — PAIN DESCRIPTION - DESCRIPTORS: DESCRIPTORS: ACHING

## 2019-06-12 NOTE — PROGRESS NOTES
Name: Christopher Jamison  : 1945  MRN: [id number]      Oncology Navigation Follow-Up Note- Pre op Needle loc. Contact Type: [facility]    Subjective: Having lumpectomy with sentinel node biopsy today with Dr. Boyd Rios. Objective: Pathology from core biopsy 19 showed invasive ductal carcinoma.  discussed procedure and patient consented per protocol    Assistance Needed:  Denies needs.  present. Education:Teaching completed on needle localization, lymphedema prevention/risk reduction, and post op exercises. Printed material from [facility] provided. Patient instructed to start post op exercise when OK'd by Dr. Boyd Rios. Questions addressed. Understanding of teaching verbalized by teach back method. Patient tearful. Emotional support given. Referrals: Patient declined referral to [facility]. Continuum of Care: Diagnosis/Active Treatment    Notes:  Needle localization preformed by Dr. Svetlana Foley. Patient tolerated procedures well. Wire secured per protocol. Transported patient via wheelchair with belongings to outpatient surgery center. [place] present. Emotional support offered. Encouraged patient to call with any questions or concerns anytime post-op.      Electronically signed by Patricia Boyd RN on 2019 at 8:26 AM

## 2019-06-12 NOTE — H&P
[address] [address]  History and Physical Update    Pt Name: Freddy Wilkinson  MRN: [id number]  YOB: 1945  Date of evaluation: 6/12/2019    [x] I have examined the patient and reviewed the H&P/Consult and there are no changes to the patient or plans. [] I have examined the patient and reviewed the H&P/Consult and have noted the following changes:        Angel Nguyen MD  Electronically signed 6/12/2019 at [address] [facility] MD Nina     General Surgery    New Patient Evaluation in Office    Pt Name: Freddy Wilkinson    Date of Birth 1945     Today's Date: 6/3/2019    Medical Record Number: [id number]    Referring Provider: June Leon MD    Primary Care Provider: Melo Niño MD    Chief Complaint:           Chief Complaint       Patient presents with       Ania Hernandez     Surgical Consult                   new patient- invasive ductal carcinoma left breast                   ASSESSMENT             1.     Breast cancer, stage 1, estrogen receptor positive, left ([facility] [address]        2. Essential hypertension        3. Pre-op testing        Clinical stage IA          PLANS           1. Schedule patient for left partial mastectomy with preoperative needle localization    2. Preoperative testing per anesthesia guidelines    3. MAC anesthesia    4. Surgical options breast conservation as well as mastectomy with and without reconstruction were discussed. Potential need for radiation therapy post breast conservation was discussed. She may be a candidate for withholding radiation therapy as she is 75years old. Also discussed she is a good candidate to with hold sentinel lymph node biopsy. She wishes to proceed with left partial mastectomy. 5.  Risks of procedure including but limited to: Bleeding, infection, need for reexcision of margins scar formation were all discussed. All questions were answered.                  Chani Lee is a 75y.o. year old female who is presenting today in the office for invasive ductal carcinoma of the left breast.  She was seen as well at multidisciplinary breast clinic where her imaging and case were reviewed and all her questions were answered. She originally presented for screening imaging at an outside facility. [facility]. 3-D breast imaging revealed an 8 mm density in the lower aspect of the left breast confirmed on ultrasound. She was referred for biopsy. Imaging was reviewed here she ultimately underwent ultrasound of both breasts and ultrasound guided biopsies of densities in both breasts. She had a mass in the lower outer quadrant of the right breast and an ultrasound-guided core biopsy was completed. She had 2 densities in the left breast 1 in the upper outer quadrant and one at the 5:30 position of the lower outer quadrant. The right breast mass and lesion left breast at 2:00 were benign without any atypia. The mass at 5:30 were about barbell clip was placed showed invasive ductal carcinoma [place] grade 2. Receptors were estrogen receptor +100% progesterone receptor +75% and HER-2 not amplified. Ultrasound imaging showed no suspicious masses within the axilla. Lymph nodes visualized appeared normal.  Patient has had no breast symptoms. She does have some mild bruising post biopsy. Surgical options were discussed patient at length in the office. She wishes to proceed with breast conservation. With age over 78 and sepsis opinion she is a good candidate to eliminate sentinel node biopsy. . She had her children between the ages of 20 and 27. She took oral contraceptive pills briefly for about 3 years. No family history of breast or ovarian malignancy. Brother history of colon cancer. She denies prior breast biopsies.          Past Medical History    Past Medical History Past Surgical History    Past Surgical History                                                                                                                                                                                                                                                                                  Medications    Current Facility-Administered Medications                                                                                                                                                                                                                                                               Allergies                Allergies       Allergen     Reactions            Aspirin     Hives            Ceftin [Cefuroxime Axetil]     Rash            Codeine     Rash            Nexium [Esomeprazole]     Diarrhea            Penicillins     Rash            Reglan [Metoclopramide]     Diarrhea             Family History    Family History                                                                                                                                                                                                                                                                                                  SocialHistory        Social History no discharge. No scleral icterus. Neck: Normal range of motion. Neck supple. No JVD present. No tracheal deviation present. Cardiovascular: Normal rate, regular rhythm and normal heart sounds. No murmur heard. Pulmonary/Chest: Effort normal and breath sounds normal. No respiratory distress. She has no wheezes. She has no rales. She exhibits no tenderness. Right breast exhibits no inverted nipple, no mass, no nipple discharge and no skin change. Left breast exhibits no inverted nipple, no mass, no nipple discharge and no skin change. untitled image    Abdominal: Soft. She exhibits no distension and no mass. There is no tenderness. No hernia. Musculoskeletal: She exhibits no edema or deformity. Lymphadenopathy:     She has no cervical adenopathy. Right cervical: No superficial cervical, no deep cervical and no posterior cervical adenopathy present. Left cervical: No superficial cervical, no deep cervical and no posterior cervical adenopathy present. Right axillary: No pectoral and no lateral adenopathy present. Left axillary: No pectoral and no lateral adenopathy present. Neurological: She is alert and oriented to person, place, and time. No cranial nerve deficit. Skin: Skin is warm and dry. No rash noted. She is not diaphoretic. Psychiatric: She has a normal mood and affect. Her behavior is normal. Thought content normal.     Vitals reviewed.                       Lab Results       Component     Value     Date             WBC     6.2     06/03/2019             HGB     13.0     06/03/2019             HCT     39.2     06/03/2019             PLT     249     06/03/2019             NA     143     06/03/2019             K     4.3     06/03/2019             CL     105     06/03/2019             CREATININE     0.7     06/03/2019             BUN     12     06/03/2019             CO2     24     06/03/2019                 Performed by: [address] [facility]ology      Momin Leilani               [id number]  [address], [address]                                                      PROC: 2019  [facility]                                    RECV: 2019  [address]                                    RPTD: 2019  TONIO JOSEEJULIA PEREZ OFFENENETTIE II.DAISY, [facility]                      MRN:  [id number]    LOC: [facility]                      ACCT: [de-identified]  SEX: F                      : 1945  AGE: 75 Y                         PATHOLOGY REPORT                      ATTN: TARA NICHOLS                      REQ: Liya Lakhani      Copies To:   Cornelia Vila      Clinical Information: LEFT BREAST MASS X 2, RIGHT BREAST MASS    FINAL DIAGNOSIS:  Addendum Report (2019):  A. Right breast mass, lower outer, core needle biopsy with tribell clip  placement:      Fibrocystic changes including stromal fibrosis, usual ductal      hyperplasia, columnar cell change, and microcysts. B. Left breast mass #2, 2:00, upper outer, core needle biopsy with  tribell clip placement:   Apocrine cysts. Background fibrocystic changes including stromal fibrosis, apocrine      metaplasia, and microcysts. C. Left breast mass, 5:30, lower outer, core needle biopsy with barbell  clip placement:   Invasive ductal carcinoma, Fernando grade 2.     BREAST BIOMARKERS*  Estrogen Receptor: (Clone SP1), [employer]   Positive 100% of cells  Intensity of Staining:   Strong    Progesterone Receptor: (Clone 1E2), [employer]   Positive 75% of cells   Intensity of Staining:      Intermediate    Ki-67 (clone 30-9)      Percentage of positive nuclei:  5%              Favorable <10%              Borderline 10-20%              Unfavorable >20%         2018 ASCO/ CAP   Inform HER2 Dual PITO       HER2 dual PITO    [employer]       Group #  ( X  Group 5:         HER2/CEP17 Ratio <2.0 and <4.0 HER2   )                    signals/cell                        HER2 PITO NEGATIVE                          Number of observers: 1                        Number of invasive tumor cells counted: 20                        Using dual probe assay:                         Average number of HER2 signals per cell: 2                         Average number of CEP17 signals per cell: 2                         HER2/CEP17 ratio: 1 to 1    External Controls: Adequate  Internal Controls: Adequate  Standard Assay Conditions: Met  Staining Method Used:   Formalin fixation   Antigen retrieval type:  Cell Conditioning 1, mild alejandro   Time in antigen retrieval:  30 minutes   Detection system type:   DAB Ultraview kit    Specimen:  A) CORE NEEDLE BREAST BIOPSY, RIGHT MASS, LOWER OUTER, TRIBELL  B) CORE NEEDLE BREAST BIOPSY, LT MASS # 2, 2:00, UPPER OUTER, TRIBELL  CLIP  C) CORE NEEDLE BREAST BIOPSY, LEFT MASS, LOWER OUTER, BARBELL, 5:30    Gross Examination:  A - The container is labeled Jerson Encarnacion, right lower outer,  tribell. Received in formalin are several thin cylindrical and  fragmented bits of yellow-white tissue aggregating to 1 x 0.6 x 0.1 cm. The specimen is entirely submitted. 1 ns. Fixative:  10% neutral buffered formalin  Tissue removal time: 1450  Tissue fixation time: 1451  Total fixation time: 29 hours 9 minutes    B - The container is labeled Jerson Encarnacion, left 2:00, tribell,  upper outer. Received in formalin are cylindrical and fragmented bits  of yellow-white fibroadipose tissue aggregating to about 1 x 0.8 x 0.1  cm. The specimen is entirely submitted. 1 ns. Fixative:  10% neutral buffered formalin  Tissue removal time: 1424  Tissue fixation time: 1425  Total fixation time: 29 hours 35 minutes    C - The container is labeled Jerson Encarnacion, left lower outer,  barbell, 5:30.   Received in formalin are three cylindrical fragments of  yellow-white tissue each about 0.1 cm in diameter x 1 cm in length. The specimen is entirely submitted. 1 ns. SIO/DKR:jcs    Fixative:  10% neutral buffered formalin  Tissue removal time: 1410  Tissue fixation time: 1411  Total fixation time:   29 hours 49 minutes    Microscopic Examination:  A. Sections show fibroglandular breast tissue with fibrocystic changes  including usual ductal hyperplasia, stromal fibrosis, and microcysts. There is no evidence of malignancy. B. Sections show fibrous glandular breast tissue with stromal  fibrosis, apocrine cysts, and chronic inflammation. There is no  evidence of epithelial atypia or malignancy. C.  Sections show infiltrative epithelial cells that are cohesive in  nature. Rare cords of cells are noted. The background stroma shows a  marked desmoplastic response. To exclude the possibility of a  component of DCIS, a p63 immunohistochemical stain is performed, with  appropriate controls. There is no expression of p63, excluding this  possibility. An E-cadherin immunohistochemical stain is performed,  with appropriate controls, to exclude the possibility of a lobular  component. There is diffuse expression of E-cadherin, consistent with  the above diagnosis. Procedure: Needle biopsy  Specimen laterality: Left  Tumor site: Lower outer, 5:30  Histologic type: Invasive carcinoma type  Histologic grade, Fernando histologic score  Glandular/tubular differentiation: Score 3  Nuclear pleomorphism: Score 2  Mitotic rate: Score 1  Overall grade: Grade 2  Ductal carcinoma in situ (DCIS): Not identified  Lymphovascular invasion: Not identified  Biomarker studies: Pending    [id number]  [id number]  [id number]  [id number]  [id number]                                                    <Sign Out Dr. Zev Ford M.D., F.C. A. P      [facility]  Printed on:  5/29/2019  Lily [address]  [facility] BUST[place]O AM, II.VIERTEL [employer]  Original print date: 05/29/2019 IMPRESSION: Ultrasound BI-RADS: 4A: Low Suspicion for Malignancy       The 1 cm x 0.5 cm x 0.9 cm lobulated lesion in the left breast        appears to have a low suspicion for malignancy. An ultrasound        guided biopsy is recommended. The nurse navigator was notified. The results were reviewed with        the patient. The biopsy is scheduled for today. #[id number] - US BREAST LIMITED LEFT       ULTRASOUND OF LEFT BREAST AND LEFT AXILLA: 5/23/2019       CLINICAL: Lt breast density. Comparison is made to exams dated:  5/14/2019 mammogram,        5/14/2019 ultrasound, 5/9/2019 ultrasound, and 3/22/2018        ultrasound - [address] [place] [address]. Real-time ultrasound of the left breast and axilla was performed        on the areas of interest.  Gray scale images of the real-time        examination were reviewed. There is a 1 cm x 0.5 cm x 0.9 cm lobulated lesion with a        circumscribed margin in the left breast upper outer aspect middle        depth 4 cm from the nipple. This lobulated lesion is of mixed        echogenicity. This correlates with mammography findings. No abnormalities were seen sonographically in the left axilla. Dr. Jenifer Kwong         nn/:5/23/2019 14:36:02                 Imaging Technologist: Carlos Luciano RT(R)(M), [address] [place], [place] [id number]                                        IMPRESSION: Ultrasound BI-RADS: 4A: Low Suspicion for Malignancy       The 0.6 cm x 0.7 cm irregular lesion in the right breast appears        to have a low suspicion for malignancy. An ultrasound guided        biopsy is recommended. The nurse navigator was notified. The results were reviewed with        the patient. The biopsy will be performed later today.                #[id number] - US BREAST LIMITED RIGHT       ULTRASOUND OF RIGHT BREAST AND RIGHT AXILLA: 5/23/2019       CLINICAL: Rt breast density. Comparison is made to exams dated:  5/14/2019 mammogram,        5/14/2019 ultrasound, 5/9/2019 ultrasound, and 3/22/2018        ultrasound - [address] [place] [address]. Real-time ultrasound of the right breast and axilla was performed        on the areas of interest.  Gray scale images of the real-time        examination were reviewed. There is a 0.6 cm x 0.7 cm irregular lesion with an irregular        margin in the right breast lower outer aspect middle depth. This        irregular lesion is hypoechoic. This correlates with        mammography findings. No abnormalities were seen sonographically in the right axilla. Dr. Alfredito Calzada         nn/:5/23/2019 14:37:59                 Imaging Technologist: Slime WOODS(R)(M), [address] [id number]                         untitled image         untitled image         untitled image         untitled image         untitled image         untitled image         untitled image              Imaging reviewed.

## 2019-06-12 NOTE — PROGRESS NOTES
0950-Patient to Phase I via cart. Report received from Jason Herrera and Missy Scott. Patient placed on cardiac monitor. Vitals obtained and stable. Respirations even and unlabored on room air. Patient awake and alert. Denies pain and nausea. Incision noted to left breast intact with surgical glue. No drainage noted. 1000-Patient resting. Provided with ice chips. Tolerating well. 1010-Vitals remain stable. Respirations even and unlabored on room air. Continues to deny pain and nausea. 1020-Patient meets criteria to move to Phase II.   1025-Patient to Phase II via cart. Patient provided with snack and drink. Patients family to bedside. Instructed on call light use. Denies needs. 1045-Patient states she is having pain in left breast. Rates pain 5/10. Patient medicated with norco per order. Patient denies nausea. Family remains at bedside. 1105-patient states pain has improved. Rates pain 2/10. Patient requesting to get dressed. IV removed with no complications. Patient assisted with getting dressed. 1115-Discharge instructions reviewed. Verbalized understanding. 1126-Patient discharged in stable condition. All belongings given to patient. Patient ambulated to car with assistance from RN. Patient tolerated well.

## 2019-06-12 NOTE — OP NOTE
[address] [place] [address]                                OPERATIVE REPORT    PATIENT NAME: An Cuellar                 :        1945  MED REC NO:   [id number]                           ROOM:  ACCOUNT NO:   [de-identified]                           ADMIT DATE: 2019  PROVIDER:     Erum Leiva M.D.    DATE OF PROCEDURE:  2019    PREOPERATIVE DIAGNOSIS:  Clinical stage I estrogen receptor positive  invasive ductal carcinoma, left breast, lower outer quadrant. POSTOPERATIVE DIAGNOSIS:  Clinical stage I estrogen receptor positive  invasive ductal carcinoma, left breast, lower outer quadrant. PROCEDURE:  Left partial mastectomy with preoperative needle  localization. SURGEON:  Erum Leiva M.D. ANESTHESIA:  General via laryngeal mask airway. ESTIMATED BLOOD LOSS:  Less than 10 mL. SPECIMEN TO PATHOLOGY:  [facility]ia [facility] [address] for specimen  radiograph, which contained the clip mass in the distal aspect of the  wire. INDICATIONS:  See history and physical examination. DESCRIPTION OF PROCEDURE:  The patient was brought the operating suite,  placed supine on the operating table. She opted out of sentinel lymph  node biopsy and over 70 was good candidate with no clinically palpable  lymph nodes or abnormal-appearing lymph nodes on ultrasound. After  needle localization procedure was performed in the [facility], she was brought to the surgery center. She was brought to the  operating room where she was placed supine on the operating table with  pneumatic sequential compression devices on the lower extremities. She  was administered general anesthetic. She was given clindamycin  intravenously. After time-out was performed, left breast and wire were  prepped and draped in usual sterile fashion.   Incision was made at the  inferior aspect of the left breast.  Wire was delivered into

## 2019-06-12 NOTE — PROGRESS NOTES
Formulation and discussion of sedation / procedure plans, risks, benefits, side effects and alternatives with patient and/or responsible adult completed.     Electronically signed by Mesfin Mann MD on 6/12/2019 at 7:25 AM

## 2019-06-12 NOTE — ANESTHESIA POSTPROCEDURE EVALUATION
Department of Anesthesiology  Postprocedure Note    Patient: Queen Skyla  MRN: [id number]  YOB: 1945  Date of evaluation: 6/12/2019  Time:  12:29 PM     Procedure Summary     Date:  06/12/19 Room / Location:  [facility] OR 04 / [address] [facility]    Anesthesia Start:  0915 Anesthesia Stop:  3938    Procedure:  LEFT BREAST LUMPECTOMY WITH PREOP NEEDLE LOC (Left ) Diagnosis:  (INVASIVE DUCTAL CARCINOMA LEFT BREAST)    Surgeon:  Emmanuel Masterson MD Responsible Provider:  Pamella Shahid MD    Anesthesia Type:  General ASA Status:  2          Anesthesia Type: General    Celeste Phase I: Celeste Score: 9    Celeste Phase II: Celeste Score: 9    Last vitals: Reviewed and per EMR flowsheets.        Anesthesia Post Evaluation    Patient location during evaluation: PACU  Patient participation: complete - patient participated  Level of consciousness: awake  Nausea & Vomiting: no nausea  Complications: no  Cardiovascular status: hemodynamically stable  Respiratory status: spontaneous ventilation

## 2019-06-13 ENCOUNTER — TELEPHONE (OUTPATIENT)
Dept: SURGERY | Age: 74
End: 2019-06-13

## 2019-06-13 NOTE — TELEPHONE ENCOUNTER
Called to check on pt post op. Spoke to pts . Pt sleeping. Pt to call office when she awakens with any concerns.

## 2019-06-14 ENCOUNTER — CLINICAL DOCUMENTATION (OUTPATIENT)
Dept: SPIRITUAL SERVICES | Facility: CLINIC | Age: 74
End: 2019-06-14

## 2019-06-14 NOTE — PROGRESS NOTES
Patient is a 59-year-old wife and mother newly diagnosed with breast cancer. She was receptive to the telephone call, and freely engaged in open conversation during the encounter as the  provided an empathic listening presence. She expressed \"I'm not really excited about my diagnosis,\" and added \"I'm rolling with the flow. \" She shared that as she moves forward \"I don't have much choice. \" She stated \"I'm pretty hopeful right now,\" and went on to add that they caught it pretty early. She is sustained through support from her . She has 5 daughters for added assistance when needed. She is a woman of esteban in [facility] [address] and prayer. She is active in her local [employer] in [facility]. She stated that the news of her cancer has not been shared with her spiritual community, just a few of her friends in addition to her family. Her purpose and meaning in life are drawn from her  and family members. She is calm and at peace moving forward expressing her esteban \"I know Someone else is in charge. \"    She expressed having adequate support at this time in her life. She was appreciative of the telephone call and ministry provided. Contact information was provided in her [facility], if further emotional or spiritual support is needed or desired.   remains available to assist.

## 2019-06-18 ENCOUNTER — TELEPHONE (OUTPATIENT)
Dept: SURGERY | Age: 74
End: 2019-06-18

## 2019-06-18 DIAGNOSIS — Z17.0 BREAST CANCER, STAGE 1, ESTROGEN RECEPTOR POSITIVE, LEFT (HCC): Primary | ICD-10-CM

## 2019-06-18 DIAGNOSIS — C50.912 BREAST CANCER, STAGE 1, ESTROGEN RECEPTOR POSITIVE, LEFT (HCC): Primary | ICD-10-CM

## 2019-06-18 NOTE — TELEPHONE ENCOUNTER
Notify patient tumor completely removed and margins negative.  Submit Oncotype DX.  Please.  Thanks. Left message for patient to call office.

## 2019-06-27 ENCOUNTER — HOSPITAL ENCOUNTER (OUTPATIENT)
Dept: PHYSICAL THERAPY | Age: 74
Setting detail: THERAPIES SERIES
Discharge: HOME | End: 2019-06-27
Payer: MEDICARE

## 2019-06-27 ENCOUNTER — OFFICE VISIT (OUTPATIENT)
Dept: SURGERY | Age: 74
End: 2019-06-27

## 2019-06-27 VITALS
HEIGHT: 62 IN | HEART RATE: 76 BPM | SYSTOLIC BLOOD PRESSURE: 118 MMHG | RESPIRATION RATE: 18 BRPM | DIASTOLIC BLOOD PRESSURE: 60 MMHG | TEMPERATURE: 98 F | WEIGHT: 184.6 LBS | BODY MASS INDEX: 33.97 KG/M2 | OXYGEN SATURATION: 98 %

## 2019-06-27 DIAGNOSIS — C50.912 BREAST CANCER, STAGE 1, ESTROGEN RECEPTOR POSITIVE, LEFT (HCC): Primary | ICD-10-CM

## 2019-06-27 DIAGNOSIS — Z98.890 POSTOPERATIVE STATE: ICD-10-CM

## 2019-06-27 DIAGNOSIS — Z17.0 BREAST CANCER, STAGE 1, ESTROGEN RECEPTOR POSITIVE, LEFT (HCC): Primary | ICD-10-CM

## 2019-06-27 PROCEDURE — 97110 THERAPEUTIC EXERCISES: CPT

## 2019-06-27 PROCEDURE — 99024 POSTOP FOLLOW-UP VISIT: CPT | Performed by: SURGERY

## 2019-06-27 PROCEDURE — 97535 SELF CARE MNGMENT TRAINING: CPT

## 2019-06-27 PROCEDURE — 97140 MANUAL THERAPY 1/> REGIONS: CPT

## 2019-06-27 NOTE — DISCHARGE SUMMARY
[facility]  OUTPATIENT REHABILITATION  PHYSICAL THERAPY   ONCOLOGY REHABILITATION   DISCHARGE NOTE  Time In: 1010  Time Out: 1050  Total timed code minutes: 40  Total treatment minutes: 40             Date: 2019  Patient Name: Sia Pulido        CSN: [id number]   : 1945  (75 y.o.)  Gender: female   Referring Practitioner: Dr. Juventino Miller  Diagnosis: C50.912, Z17.0  Additional Pertinent Hx: Diagnosed with L Breast IDC ER/VA+, HER2- on 19, L lumpectomy 19; history of HTN, GERD, and IBS       Insurance:  Medicare - no visit limit, aquatics and modalities covered but no ionto, hot or cold packs   Total # Visits Approved: Total # Visits to Date: 2   Certification Date:  Progress Note Counter:2/10 for PN   Date of Physician Follow-Up: 7/3/19 Maynor Graff Chart Reviewed: Yes     Subjective: Subjective: Lumpectomy without issues. Follows up with physician today. Denies any swelling or difficulty. States using her arms symmetrically. Pain: Pain Assessment  Patient Currently in Pain: Denies    Manual Treatments/Provider Interaction: Range of motion and strength measures taken. Left breast examined with incision well healing with glue in place. Advised pt to keep glue in place as long as possible over the incision to help in the healing process. Once glue has fallen off, ok to start gentle scar massage to soften firm tissue located deep to incisional area to help improved tolerance of wearing bra. Technique demonstrated on pt's arm with good understanding noted. Completed QuickDASH questionnaire with results reviewed. Educated pt to continue to stay away from heavy lifting/push/pulling for up to 8 weeks to allow complete healing but important to continue with activity and normal, daily use to maintain her strength and ROM. Concern with 100# dog and advised to not walk dog or try to lift dog food package until 8 weeks are up with gradual progression in lifting.  Pt with good

## 2019-06-27 NOTE — PROGRESS NOTES
Williams Schulte MD  General Surgery  Postprocedure Evaluation in Office  Pt Name: Jose Waite  Date of Birth 1945   Today's Date: 6/27/2019  Medical Record Number: [id number]  Primary Care Provider: Mayito Brown MD  Chief Complaint   Patient presents with    Post-Op Check     s/p LEFT BREAST LUMPECTOMY WITH PREOP NEEDLE LOC 6/12     ASSESSMENT      1. Breast cancer, stage 1, estrogen receptor positive, left ([address]    2. Postoperative state         PLANS       1. Pathology reviewed with the patient who understands. All questions were answered. 2. Follow up: Return in about 6 months (around 12/27/2019). 3.  Patient scheduled for medical oncology consultation. Dr. More Koenig. 4.  She wants to wait on radiation oncology consultation until she sees Dr. More Koenig. 5.  Pathology reviewed with patient. Margins negative. Type DX 19. Santos Cortes is seen today for post-op follow-up. She is status post partial mastectomy  She is tolerating a regular diet, having regular bowel movements. Symptoms and activity have gradually improved compared to preoperative. The surgical site is clean and has no drainage. Pain is controlled without any medications. . She has compliant with postoperative instructions.     Medications    Current Outpatient Medications:     amLODIPine (NORVASC) 5 MG tablet, Take 5 mg by mouth daily, Disp: , Rfl:     azelastine (ASTELIN) 0.1 % nasal spray, 1 spray by Nasal route 2 times daily Use in each nostril as directed, Disp: , Rfl:     famotidine (PEPCID) 20 MG tablet, Take 20 mg by mouth 2 times daily, Disp: , Rfl:     fexofenadine (HM FEXOFENADINE HCL) 180 MG tablet, Take 180 mg by mouth daily, Disp: , Rfl:     metoprolol succinate (TOPROL XL) 50 MG extended release tablet, Take 50 mg by mouth daily, Disp: , Rfl:     Multiple Vitamins-Minerals (THERAPEUTIC MULTIVITAMIN-MINERALS) tablet, Take 1 tablet by mouth daily, Disp: , Rfl:     montelukast (SINGULAIR) 10 MG tablet, Take 10 mg by mouth nightly, Disp: , Rfl:   Allergies    Allergies   Allergen Reactions    Aspirin Hives    Ceftin [Cefuroxime Axetil] Rash    Codeine Rash    Nexium [Esomeprazole] Diarrhea    Penicillins Rash    Reglan [Metoclopramide] Diarrhea       Review of Systems  History obtained from the patient. Constitutional: Denies any fever, chills, fatigue. Wound: Denies any rash, skin color changes or wound problems. Resp: Denies any cough, shortness of breath. CV: Denies any chest pain, orthopnea or syncope. GI: Denies any nausea, vomiting, blood in the stool, constipation or diarrhea. OBJECTIVE     VITALS: /60 (Site: Right Upper Arm, Position: Sitting, Cuff Size: Medium Adult)   Pulse 76   Temp 98 °F (36.7 °C) (Tympanic)   Resp 18   Ht 5' 2\" (1.575 m)   Wt 184 lb 9.6 oz (83.7 kg)   SpO2 98%   BMI 33.76 kg/m²     CONSTITUTIONAL: Alert and oriented times 3, no acute distress and cooperative to examination. SKIN: Skin color, texture, turgor normal. No rashes or lesions. INCISION: wound margins intact and healing well. No signs of infection. No drainage. NECK: Soft, trachea midline and straight  BREAST: Lumpectomy left No evidence of seroma or hematoma. LUNGS: Clear  CARDIOVASCULAR: Heart sounds are normal. NEUROLOGIC: No sensory or motor nerve irritation  EXTREMITIES: no cyanosis, no clubbing and no edema.      Performed by: [facility] Pathology     Jess Zhengen               [id number]  Assoc.                                              Page 1 of 0 [address], [place] [address]                                                      PROC: 2019  [facility]                                    RECV: 2019  [address]                                    RPTD: 2019  [address], [place] [address]                      MRN:  [id number]    LOC: ASOR                      ACCT: [de-identified]  SEX: F                      : 1945  AGE: 75 Y             cassette #7 - cranial,  skin and deep margin; cassette #8 - caudal, skin and deep margin;  cassette #9 - lateral margin.  ss.  MTK/VICTOR HUGOR:[id number]    Fixative:  10% neutral buffered formalin  Tissue removal time:  09:34  Tissue fixation time:  10:05  Total fixation time: 9 hours 55 minutes    Microscopic Examination:  Invasive carcinoma the breast: Resection  Procedure: Excision  Specimen laterality: Left  Tumor site: Not specified  Tumor size: Greatest dimension of largest invasive focus: 11 mm  Histologic type:  Invasive ductal carcinoma, not otherwise specified  Histologic grade-Bigelow histologic score  Glandular/tubular differentiation: Score 3  Nuclear pleomorphism: Score 2  Mitotic rate: Score 1  Overall grade: Grade 2    Ductal carcinoma in situ: Not identified  Margins  Invasive carcinoma margins  Uninvolved by invasive carcinoma   Distance from closest margin: 5 mm (skin margin)    Regional lymph nodes: No nodes submitted or found  Treatment effect: No known presurgical therapy    Pathologic stage classification, AJCC eighth edition  Primary tumor  pT1c: Tumor >10 mm but less than 20 mm in greatest dimension  Regional lymph nodes  pNX: Regional lymph nodes cannot be assessed    Previously performed breast biomarkers [id number]; 5/23/2019*  Estrogen Receptor: (Clone SP1), [email or web address]  Positive 100% of cells  Intensity of Staining:   Strong    Progesterone Receptor: (Clone 1E2), [employer]  Positive 75% of cells  Intensity of Staining:  Intermediate    Ki-67 (clone 30-9)      Percentage of positive nuclei:  5%              Favorable <10%              Borderline 10-20%              Unfavorable >20%    2018 ASCO/ CAP HER2 dual PITO Group # Inform HER2 Dual PITO  [email or web address]  ( X ) Group 5: HER2 PITO NEGATIVE    HER2/CEP17 Ratio <2.0 and <4.0 HER2 signals/cell    Number of observers: 1  Number of invasive tumor cells counted: 20  Using dual probe assay:   Average number of HER2

## 2019-07-03 ENCOUNTER — OFFICE VISIT (OUTPATIENT)
Dept: ONCOLOGY | Age: 74
End: 2019-07-03
Payer: MEDICARE

## 2019-07-03 ENCOUNTER — HOSPITAL ENCOUNTER (OUTPATIENT)
Dept: INFUSION THERAPY | Age: 74
Discharge: HOME | End: 2019-07-03
Payer: MEDICARE

## 2019-07-03 VITALS
SYSTOLIC BLOOD PRESSURE: 154 MMHG | HEIGHT: 62 IN | RESPIRATION RATE: 18 BRPM | BODY MASS INDEX: 33.79 KG/M2 | OXYGEN SATURATION: 97 % | WEIGHT: 183.6 LBS | TEMPERATURE: 98.3 F | HEART RATE: 72 BPM | DIASTOLIC BLOOD PRESSURE: 78 MMHG

## 2019-07-03 DIAGNOSIS — C50.512 MALIGNANT NEOPLASM OF LOWER-OUTER QUADRANT OF LEFT BREAST OF FEMALE, ESTROGEN RECEPTOR POSITIVE (HCC): Primary | ICD-10-CM

## 2019-07-03 DIAGNOSIS — Z51.81 ENCOUNTER FOR MONITORING AROMATASE INHIBITOR THERAPY: ICD-10-CM

## 2019-07-03 DIAGNOSIS — Z17.0 MALIGNANT NEOPLASM OF LOWER-OUTER QUADRANT OF LEFT BREAST OF FEMALE, ESTROGEN RECEPTOR POSITIVE (HCC): Primary | ICD-10-CM

## 2019-07-03 DIAGNOSIS — Z79.811 ENCOUNTER FOR MONITORING AROMATASE INHIBITOR THERAPY: ICD-10-CM

## 2019-07-03 PROCEDURE — G8417 CALC BMI ABV UP PARAM F/U: HCPCS | Performed by: INTERNAL MEDICINE

## 2019-07-03 PROCEDURE — 99211 OFF/OP EST MAY X REQ PHY/QHP: CPT

## 2019-07-03 PROCEDURE — 1090F PRES/ABSN URINE INCON ASSESS: CPT | Performed by: INTERNAL MEDICINE

## 2019-07-03 PROCEDURE — 3014F SCREEN MAMMO DOC REV: CPT | Performed by: INTERNAL MEDICINE

## 2019-07-03 PROCEDURE — 1036F TOBACCO NON-USER: CPT | Performed by: INTERNAL MEDICINE

## 2019-07-03 PROCEDURE — 3017F COLORECTAL CA SCREEN DOC REV: CPT | Performed by: INTERNAL MEDICINE

## 2019-07-03 PROCEDURE — 1123F ACP DISCUSS/DSCN MKR DOCD: CPT | Performed by: INTERNAL MEDICINE

## 2019-07-03 PROCEDURE — G8427 DOCREV CUR MEDS BY ELIG CLIN: HCPCS | Performed by: INTERNAL MEDICINE

## 2019-07-03 PROCEDURE — 99205 OFFICE O/P NEW HI 60 MIN: CPT | Performed by: INTERNAL MEDICINE

## 2019-07-03 PROCEDURE — 4040F PNEUMOC VAC/ADMIN/RCVD: CPT | Performed by: INTERNAL MEDICINE

## 2019-07-03 PROCEDURE — G8400 PT W/DXA NO RESULTS DOC: HCPCS | Performed by: INTERNAL MEDICINE

## 2019-07-03 RX ORDER — CHLORDIAZEPOXIDE HYDROCHLORIDE AND CLIDINIUM BROMIDE 5; 2.5 MG/1; MG/1
1 CAPSULE ORAL DAILY PRN
COMMUNITY

## 2019-07-03 RX ORDER — ANASTROZOLE 1 MG/1
1 TABLET ORAL DAILY
Qty: 30 TABLET | Refills: 11 | Status: SHIPPED | OUTPATIENT
Start: 2019-07-03 | End: 2020-06-18 | Stop reason: SINTOL

## 2019-07-13 PROBLEM — Z51.81 ENCOUNTER FOR MONITORING AROMATASE INHIBITOR THERAPY: Status: ACTIVE | Noted: 2019-07-13

## 2019-07-13 PROBLEM — Z79.811 ENCOUNTER FOR MONITORING AROMATASE INHIBITOR THERAPY: Status: ACTIVE | Noted: 2019-07-13

## 2019-07-22 ENCOUNTER — TELEPHONE (OUTPATIENT)
Dept: ONCOLOGY | Age: 74
End: 2019-07-22

## 2019-07-22 NOTE — TELEPHONE ENCOUNTER
Patient started the anastrozole just 3 days ago and she has noticed her BP is up in the 150s and the patient has gained 5lbs in 3 days.  She is wandering what else can be done or is this normal      Please advise

## 2019-08-20 ENCOUNTER — TELEPHONE (OUTPATIENT)
Dept: ONCOLOGY | Age: 74
End: 2019-08-20

## 2019-08-20 NOTE — TELEPHONE ENCOUNTER
Patient is calling started the Arimidex a couple weeks ago and her BP was up and her chest was hurting she was on it 3 days. She stopped for a few weeks and was going to start back up and is afraid to.     Please advise

## 2019-10-09 ENCOUNTER — OFFICE VISIT (OUTPATIENT)
Dept: ONCOLOGY | Age: 74
End: 2019-10-09
Payer: MEDICARE

## 2019-10-09 ENCOUNTER — HOSPITAL ENCOUNTER (OUTPATIENT)
Dept: INFUSION THERAPY | Age: 74
Discharge: HOME | End: 2019-10-09
Payer: MEDICARE

## 2019-10-09 VITALS
SYSTOLIC BLOOD PRESSURE: 145 MMHG | HEIGHT: 62 IN | HEART RATE: 75 BPM | WEIGHT: 188.6 LBS | BODY MASS INDEX: 34.71 KG/M2 | OXYGEN SATURATION: 98 % | DIASTOLIC BLOOD PRESSURE: 86 MMHG | TEMPERATURE: 97.9 F | RESPIRATION RATE: 16 BRPM

## 2019-10-09 DIAGNOSIS — C50.512 MALIGNANT NEOPLASM OF LOWER-OUTER QUADRANT OF LEFT BREAST OF FEMALE, ESTROGEN RECEPTOR POSITIVE (HCC): Primary | ICD-10-CM

## 2019-10-09 DIAGNOSIS — Z17.0 MALIGNANT NEOPLASM OF LOWER-OUTER QUADRANT OF LEFT BREAST OF FEMALE, ESTROGEN RECEPTOR POSITIVE (HCC): Primary | ICD-10-CM

## 2019-10-09 PROCEDURE — G8400 PT W/DXA NO RESULTS DOC: HCPCS | Performed by: INTERNAL MEDICINE

## 2019-10-09 PROCEDURE — 99213 OFFICE O/P EST LOW 20 MIN: CPT | Performed by: INTERNAL MEDICINE

## 2019-10-09 PROCEDURE — 1090F PRES/ABSN URINE INCON ASSESS: CPT | Performed by: INTERNAL MEDICINE

## 2019-10-09 PROCEDURE — G8484 FLU IMMUNIZE NO ADMIN: HCPCS | Performed by: INTERNAL MEDICINE

## 2019-10-09 PROCEDURE — 1036F TOBACCO NON-USER: CPT | Performed by: INTERNAL MEDICINE

## 2019-10-09 PROCEDURE — 99211 OFF/OP EST MAY X REQ PHY/QHP: CPT

## 2019-10-09 PROCEDURE — G8427 DOCREV CUR MEDS BY ELIG CLIN: HCPCS | Performed by: INTERNAL MEDICINE

## 2019-10-09 PROCEDURE — G8417 CALC BMI ABV UP PARAM F/U: HCPCS | Performed by: INTERNAL MEDICINE

## 2019-10-09 PROCEDURE — 1123F ACP DISCUSS/DSCN MKR DOCD: CPT | Performed by: INTERNAL MEDICINE

## 2019-10-09 PROCEDURE — 3017F COLORECTAL CA SCREEN DOC REV: CPT | Performed by: INTERNAL MEDICINE

## 2019-10-09 PROCEDURE — 4040F PNEUMOC VAC/ADMIN/RCVD: CPT | Performed by: INTERNAL MEDICINE

## 2019-12-19 ENCOUNTER — OFFICE VISIT (OUTPATIENT)
Dept: SURGERY | Age: 74
End: 2019-12-19
Payer: MEDICARE

## 2019-12-19 VITALS
WEIGHT: 185 LBS | HEIGHT: 62 IN | DIASTOLIC BLOOD PRESSURE: 78 MMHG | TEMPERATURE: 98.1 F | HEART RATE: 66 BPM | BODY MASS INDEX: 34.04 KG/M2 | OXYGEN SATURATION: 96 % | RESPIRATION RATE: 18 BRPM | SYSTOLIC BLOOD PRESSURE: 122 MMHG

## 2019-12-19 DIAGNOSIS — Z17.0 BREAST CANCER, STAGE 1, ESTROGEN RECEPTOR POSITIVE, LEFT (HCC): Primary | ICD-10-CM

## 2019-12-19 DIAGNOSIS — I10 ESSENTIAL HYPERTENSION: ICD-10-CM

## 2019-12-19 DIAGNOSIS — C50.912 BREAST CANCER, STAGE 1, ESTROGEN RECEPTOR POSITIVE, LEFT (HCC): Primary | ICD-10-CM

## 2019-12-19 PROCEDURE — 4040F PNEUMOC VAC/ADMIN/RCVD: CPT | Performed by: SURGERY

## 2019-12-19 PROCEDURE — 1090F PRES/ABSN URINE INCON ASSESS: CPT | Performed by: SURGERY

## 2019-12-19 PROCEDURE — G8427 DOCREV CUR MEDS BY ELIG CLIN: HCPCS | Performed by: SURGERY

## 2019-12-19 PROCEDURE — 99213 OFFICE O/P EST LOW 20 MIN: CPT | Performed by: SURGERY

## 2019-12-19 PROCEDURE — G8400 PT W/DXA NO RESULTS DOC: HCPCS | Performed by: SURGERY

## 2019-12-19 PROCEDURE — G8484 FLU IMMUNIZE NO ADMIN: HCPCS | Performed by: SURGERY

## 2019-12-19 PROCEDURE — 1036F TOBACCO NON-USER: CPT | Performed by: SURGERY

## 2019-12-19 PROCEDURE — G8417 CALC BMI ABV UP PARAM F/U: HCPCS | Performed by: SURGERY

## 2019-12-19 PROCEDURE — 3017F COLORECTAL CA SCREEN DOC REV: CPT | Performed by: SURGERY

## 2019-12-19 PROCEDURE — 1123F ACP DISCUSS/DSCN MKR DOCD: CPT | Performed by: SURGERY

## 2019-12-22 ASSESSMENT — ENCOUNTER SYMPTOMS
BLOOD IN STOOL: 0
SINUS PRESSURE: 1
COLOR CHANGE: 0
COUGH: 0
VOMITING: 0
SORE THROAT: 0
WHEEZING: 0
VOICE CHANGE: 0
ABDOMINAL PAIN: 0
SHORTNESS OF BREATH: 0
TROUBLE SWALLOWING: 0
NAUSEA: 0

## 2020-01-16 ENCOUNTER — HOSPITAL ENCOUNTER (OUTPATIENT)
Dept: WOMENS IMAGING | Age: 75
Discharge: HOME | End: 2020-01-16
Attending: SURGERY
Payer: MEDICARE

## 2020-01-16 PROCEDURE — G0279 TOMOSYNTHESIS, MAMMO: HCPCS

## 2020-04-08 ENCOUNTER — HOSPITAL ENCOUNTER (OUTPATIENT)
Dept: INFUSION THERAPY | Age: 75
Discharge: HOME | End: 2020-04-08
Payer: MEDICARE

## 2020-04-08 ENCOUNTER — OFFICE VISIT (OUTPATIENT)
Dept: ONCOLOGY | Age: 75
End: 2020-04-08
Payer: MEDICARE

## 2020-04-08 VITALS
OXYGEN SATURATION: 99 % | WEIGHT: 182 LBS | DIASTOLIC BLOOD PRESSURE: 79 MMHG | BODY MASS INDEX: 33.49 KG/M2 | TEMPERATURE: 97.4 F | HEART RATE: 70 BPM | HEIGHT: 62 IN | RESPIRATION RATE: 18 BRPM | SYSTOLIC BLOOD PRESSURE: 174 MMHG

## 2020-04-08 PROCEDURE — G8417 CALC BMI ABV UP PARAM F/U: HCPCS | Performed by: INTERNAL MEDICINE

## 2020-04-08 PROCEDURE — G8400 PT W/DXA NO RESULTS DOC: HCPCS | Performed by: INTERNAL MEDICINE

## 2020-04-08 PROCEDURE — 99213 OFFICE O/P EST LOW 20 MIN: CPT | Performed by: INTERNAL MEDICINE

## 2020-04-08 PROCEDURE — 99211 OFF/OP EST MAY X REQ PHY/QHP: CPT

## 2020-04-08 PROCEDURE — 3017F COLORECTAL CA SCREEN DOC REV: CPT | Performed by: INTERNAL MEDICINE

## 2020-04-08 PROCEDURE — 1036F TOBACCO NON-USER: CPT | Performed by: INTERNAL MEDICINE

## 2020-04-08 PROCEDURE — 4040F PNEUMOC VAC/ADMIN/RCVD: CPT | Performed by: INTERNAL MEDICINE

## 2020-04-08 PROCEDURE — 1123F ACP DISCUSS/DSCN MKR DOCD: CPT | Performed by: INTERNAL MEDICINE

## 2020-04-08 PROCEDURE — G8427 DOCREV CUR MEDS BY ELIG CLIN: HCPCS | Performed by: INTERNAL MEDICINE

## 2020-04-08 PROCEDURE — 1090F PRES/ABSN URINE INCON ASSESS: CPT | Performed by: INTERNAL MEDICINE

## 2020-04-15 NOTE — PROGRESS NOTES
[facility]  [facility] NETWORK OF [facility]  [facility] [address] [facility]  [address] [facility] [address]  [address]  Dept: [phone]  Dept Fax: [phone]  Loc: [phone]    Subjective:      Chief Complaint: Anna Eaton is a 75 y.o. female with breast cancer. Deedee Villatoro was noted to have an abnormal screening mammogram in May, 2019. Abnormalities were noted on the mammogram in both breasts. One located in the lower outer quadrant of the right breast, a second being located in the upper outer quadrant of the left breast and a third being located in the lower outer quadrant of the left breast.  And a second being located in the. She underwent further evaluation with ultrasound and biopsy of these areas of concern. Malignancy was found in the mass located in the lower outer quadrant of the left breast.  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 Context to this condition, the malignancy was noted to be estrogen receptor positive at 100%, progesterone receptor positive at 75%, and HER-2/steven was not amplified. Imaging of lymph nodes were unremarkable. At the time of the patient's abnormal mammogram she had no associated signs and symptoms of breast disease. She has no prior history of breast disease. Pathology from the other 2 biopsies obtained were to be benign findings. She was seen by Dr. Freddy Panchal and underwent surgical intervention. The patient had a lumpectomy and sentinel node evaluation completed at the time of her surgery. Definitive diagnosis confirmed an invasive ductal carcinoma involving the left breast.  All margins were clear of the malignancy. The malignancy measured 1.1 cm in size. Enderlin lymph node analysis was unremarkable for malignancy. In context to the pathology obtained she was also noted to have an Oncotype DX recurrence score of 19.   This would place the patient at low

## 2020-06-18 ENCOUNTER — OFFICE VISIT (OUTPATIENT)
Dept: SURGERY | Age: 75
End: 2020-06-18
Payer: MEDICARE

## 2020-06-18 VITALS
HEIGHT: 62 IN | SYSTOLIC BLOOD PRESSURE: 132 MMHG | RESPIRATION RATE: 14 BRPM | OXYGEN SATURATION: 99 % | HEART RATE: 72 BPM | TEMPERATURE: 97.2 F | DIASTOLIC BLOOD PRESSURE: 67 MMHG | BODY MASS INDEX: 33.51 KG/M2 | WEIGHT: 182.1 LBS

## 2020-06-18 PROCEDURE — G8400 PT W/DXA NO RESULTS DOC: HCPCS | Performed by: SURGERY

## 2020-06-18 PROCEDURE — G8417 CALC BMI ABV UP PARAM F/U: HCPCS | Performed by: SURGERY

## 2020-06-18 PROCEDURE — G8427 DOCREV CUR MEDS BY ELIG CLIN: HCPCS | Performed by: SURGERY

## 2020-06-18 PROCEDURE — 3017F COLORECTAL CA SCREEN DOC REV: CPT | Performed by: SURGERY

## 2020-06-18 PROCEDURE — 1090F PRES/ABSN URINE INCON ASSESS: CPT | Performed by: SURGERY

## 2020-06-18 PROCEDURE — 1123F ACP DISCUSS/DSCN MKR DOCD: CPT | Performed by: SURGERY

## 2020-06-18 PROCEDURE — 4040F PNEUMOC VAC/ADMIN/RCVD: CPT | Performed by: SURGERY

## 2020-06-18 PROCEDURE — 99213 OFFICE O/P EST LOW 20 MIN: CPT | Performed by: SURGERY

## 2020-06-18 PROCEDURE — 1036F TOBACCO NON-USER: CPT | Performed by: SURGERY

## 2020-06-18 ASSESSMENT — ENCOUNTER SYMPTOMS
TROUBLE SWALLOWING: 0
BLOOD IN STOOL: 0
VOICE CHANGE: 0
COUGH: 0
WHEEZING: 0
NAUSEA: 0
VOMITING: 0
SORE THROAT: 0
SHORTNESS OF BREATH: 0
COLOR CHANGE: 0

## 2020-06-18 NOTE — PROGRESS NOTES
Giuseppe Ruiz MD   General Surgery  New Patient Evaluation in Office  Pt Name: Tuan Leigh  Date of Birth 1945   Today's Date: 6/18/2020  Medical Record Number: [id number]  Referring Provider: No ref. provider found  Primary Care Provider: Michael Torres MD  Chief Complaint:  Chief Complaint   Patient presents with    1 Year Follow Up     s/p 6/12/19 Left partial mastectomy with preoperative needle loc-left breast cancer- mammo done 1/16/20       ASSESSMENT      1. History of left breast cancer    2. Breast cancer, stage 1, estrogen receptor positive, left ([address]    3. Essential hypertension         PLANS      1. Clinical breast examination performed, benign. 2.  Schedule mammography. Six-month follow-up imaging from January reviewed. 3.  Breast cancer survivorship plan discussed. 4.  Encourage self breast examinations monthly. Call for any changes. 5.  Follow-up surgical clinic in 6 months. SUBJECTIVE   History of present illness:  Simon Hill is a 75y.o. year old female who is presenting today in the office for follow-up of left breast cancer. She had an abnormal screening mammogram in May 2019. Abnormalities were seen in both breasts. She underwent diagnostic imaging, ultrasound and biopsies of the areas of concern. She had a invasive ductal carcinoma found in the lower outer quadrant of the left breast it was nuclear grade 2. Was ER +100% CA +75% and HER-2/steven was not amplified. Ultrasound imaging of the axilla revealed no suspicious findings. Biopsies of additional areas were benign. Simon Hill underwent a left lumpectomy and sentinel lymph node biopsy. Final pathology margins were negative. Invasive cancer was 1.1 cm in size. Closest margin was 5 mm to the skin. Lymph nodes were negative for malignancy. Oncotype DX recurrence score was 19. Patient opted out of radiation therapy.   She was started on anastrozole but 3 days after starting she had issues with blood pressure and went off the aromatase inhibitor. She never resumed. History:.  Jose Angel Samuel denies any breast issues. She is lately had some seasonal allergies. She denies any new cancer diagnoses in her family that she is aware of.  6-month follow-up breast imaging in . Resume yearly imaging. Bilateral diagnostic mammography ordered. She is doing well has no new health issues per her report. She has no signs or symptoms of lymphedema. She denies any vaginal bleeding.     . She had her children between the ages of 20 and 27. She took oral contraceptive pills briefly for about 3 years. No family history of breast or ovarian malignancy. Brother history of colon cancer. She denies prior breast biopsies. Past Medical History  Past Medical History:   Diagnosis Date    Diverticulitis     GERD (gastroesophageal reflux disease)     Hypertension     IBS (irritable bowel syndrome)     Invasive ductal carcinoma of breast ([address]     left       Past Surgical History  Past Surgical History:   Procedure Laterality Date    BREAST BIOPSY Left 2019    [facility]-    BREAST LUMPECTOMY Left 2019    LEFT BREAST LUMPECTOMY WITH PREOP NEEDLE LOC performed by Sarah Artis MD at [facility]  10/19/2007    [facility]   Helen Meredith  2000    Dr Mynor Garcia in [facility] [address]    COLONOSCOPY  2017    Dr Priscilla Mueller Bilateral [facility]       Medications  Current Outpatient Medications   Medication Sig Dispense Refill    chlordiazePOXIDE-clidinium (LIBRAX) 5-2.5 MG per capsule Take 1 capsule by mouth daily as needed.       amLODIPine (NORVASC) 5 MG tablet Take 5 mg by mouth daily      azelastine (ASTELIN) 0.1 % nasal spray 1 spray by Nasal route 2 times daily Use in each nostril as directed      famotidine Partners: Male   Lifestyle    Physical activity     Days per week: Not on file     Minutes per session: Not on file    Stress: Not on file   Relationships    Social connections     Talks on phone: Not on file     Gets together: Not on file     Attends Latter day service: Not on file     Active member of club or organization: Not on file     Attends meetings of clubs or organizations: Not on file     Relationship status: Not on file    Intimate partner violence     Fear of current or ex partner: Not on file     Emotionally abused: Not on file     Physically abused: Not on file     Forced sexual activity: Not on file   Other Topics Concern    Not on file   Social History Narrative    Not on file           Review of Systems  Review of Systems   Constitutional: Negative for chills, fatigue, fever and unexpected weight change. HENT: Positive for congestion. Negative for sinus pressure, sore throat, trouble swallowing and voice change. Reports congestion due to seasonal allergies. Eyes: Negative for visual disturbance. Respiratory: Negative for cough, shortness of breath and wheezing. Cardiovascular: Negative for chest pain and palpitations. Gastrointestinal: Positive for abdominal pain. Negative for blood in stool, nausea and vomiting. History of irritable bowel syndrome   Endocrine: Negative for cold intolerance, heat intolerance and polydipsia. Genitourinary: Negative for dysuria, flank pain and hematuria. Musculoskeletal: Negative for gait problem, joint swelling and myalgias. Skin: Negative for color change and rash. Allergic/Immunologic: Positive for environmental allergies. Negative for immunocompromised state. Neurological: Negative for dizziness, tremors, seizures and speech difficulty. Hematological: Does not bruise/bleed easily. Psychiatric/Behavioral: Negative for behavioral problems, confusion and suicidal ideas.        OBJECTIVE     /67 (Site: Right Upper Arm, Position: Sitting, Cuff Size: Medium Adult)   Pulse 72   Temp 97.2 °F (36.2 °C) (Tympanic)   Resp 14   Ht 5' 2\" (1.575 m)   Wt 182 lb 1.6 oz (82.6 kg)   SpO2 99%   BMI 33.31 kg/m²      Physical Exam  Vitals signs reviewed. Constitutional:       Appearance: Normal appearance. She is well-developed. She is not ill-appearing or diaphoretic. HENT:      Head: Normocephalic and atraumatic. Nose: Nose normal. No congestion or rhinorrhea. Mouth/Throat:      Pharynx: No posterior oropharyngeal erythema. Eyes:      General: No scleral icterus. Extraocular Movements: Extraocular movements intact. Pupils: Pupils are equal, round, and reactive to light. Neck:      Musculoskeletal: Neck supple. No muscular tenderness. Vascular: No JVD. Trachea: No tracheal deviation. Cardiovascular:      Rate and Rhythm: Normal rate and regular rhythm. Heart sounds: Normal heart sounds. No murmur. Pulmonary:      Effort: Pulmonary effort is normal. No respiratory distress. Breath sounds: Normal breath sounds. No wheezing. Chest:      Chest wall: No tenderness. Breasts:         Right: No swelling, bleeding, inverted nipple, mass, nipple discharge or skin change. Left: No swelling, bleeding, inverted nipple, mass, nipple discharge or skin change. Abdominal:      General: There is no distension. Palpations: Abdomen is soft. There is no mass. Tenderness: There is no abdominal tenderness. Musculoskeletal:         General: No deformity. Lymphadenopathy:      Cervical: No cervical adenopathy. Right cervical: No superficial, deep or posterior cervical adenopathy. Left cervical: No superficial, deep or posterior cervical adenopathy. Upper Body:      Right upper body: No supraclavicular, axillary or pectoral adenopathy. Left upper body: No supraclavicular, axillary or pectoral adenopathy. Skin:     General: Skin is warm and dry. Findings: No rash. Neurological:      Mental Status: She is alert and oriented to person, place, and time. Cranial Nerves: No cranial nerve deficit. Psychiatric:         Behavior: Behavior normal.         Thought Content: Thought content normal.         Lab Results   Component Value Date    WBC 6.2 2019    HGB 13.0 2019    HCT 39.2 2019     2019     2019    K 4.3 2019     2019    CREATININE 0.7 2019    BUN 12 2019    CO2 24 2019      Performed by: [facility] Pathology     [facility]               [id number]  Assoc.                                              Page 1 of 2 [address]  [place], [place] [address]                                                      PROC: 2019  [facility]                                    RECV: 2019  [address]                                    RPTD: 2019  [place], [place] [address]                      MRN:  [id number]    LOC: [facility]                      ACCT: [de-identified]  SEX: F                      : 1945  AGE: 75 Y                         PATHOLOGY REPORT                      ATTN: TARA EASTON  [de-identified]                  REQ: TARA EASTON        Clinical Information: INVASIVE DUCTAL CARCINOMA LEFT BREAST    ADDENDUM REPORT 19, 19:    FINAL DIAGNOSIS:  Left breast, excision:   Invasive ductal carcinoma, Fernando grade 2 (pT1c).  Margins are negative.    Distance to closest margin: 5 mm (skin margin). COMMENT 19:  The slides and report were reviewed by Dr. Sumanth Chance and  archival tissue (block A4) was selected for the following testing:  Oncotype DX (ordered by Dr. Jaguar Jones).   Please see the separately issued  reference report for the results to these test(s).     ADDENDUM REPORT 19:  Oncotype DX Breast Cancer Assay (RT-PCR) performed by [employer]  Breast Cancer Recurrence Score:   19    Please see full reference report under Media tab, Pathology, in [address]. Specimen:  EXCISION OF BREAST, LEFT      Gross Examination:  The container is labeled Lennart Sandhoff, left breast tissue. Received fresh on an x-ray grid is an oriented lumpectomy specimen  including a localization wire.  The specimen measures 4 cm from medial  to lateral margins, 4 cm from the cranial to caudal margins and about  2.4 cm from the skin to deep margin.  The accompanying x-ray indicates  a clip at grid coordinate C3.  The deep margin is inked blue.  The skin  margin is inked yellow.  The cranial and medial margins are inked  black; and the caudal and lateral margins are inked green.  There is a  firm, white, moderately circumscribed lesion grossly measuring about  1.2 x 0.9 x 1.5 cm.  The lesion is focally 0.3 cm from the skin margin.   The biopsy site is 0.2 cm from the deep margin.  Representative  sections are submitted:  Cassette #1 - medial margin; cassette #2 -  cranial, skin and deep margin; cassette #3 - caudal, skin and deep  margin; cassette #4 - skin and deep margin with mass; cassette #5 -  cranial margin; cassette #6 - caudal margin; cassette #7 - cranial,  skin and deep margin; cassette #8 - caudal, skin and deep margin;  cassette #9 - lateral margin.  ss.  MTK/DKR:[email or web address]    Fixative:  10% neutral buffered formalin  Tissue removal time:  09:34  Tissue fixation time:  10:05  Total fixation time: 9 hours 55 minutes    Microscopic Examination:  Invasive carcinoma the breast: Resection  Procedure: Excision  Specimen laterality: Left  Tumor site: Not specified  Tumor size: Greatest dimension of largest invasive focus: 11 mm  Histologic type:  Invasive ductal carcinoma, not otherwise specified  Histologic grade-Fernando histologic score  Glandular/tubular differentiation: Score 3  Nuclear pleomorphism: Score 2  Mitotic rate: Score 1  Overall grade: Grade 2    Ductal carcinoma in situ: Not identified  Margins  Invasive carcinoma margins  Uninvolved by

## 2020-06-19 ASSESSMENT — ENCOUNTER SYMPTOMS
ABDOMINAL PAIN: 1
SINUS PRESSURE: 0

## 2020-06-22 ENCOUNTER — HOSPITAL ENCOUNTER (OUTPATIENT)
Dept: WOMENS IMAGING | Age: 75
Discharge: HOME | End: 2020-06-22
Payer: MEDICARE

## 2020-06-22 PROCEDURE — G0279 TOMOSYNTHESIS, MAMMO: HCPCS

## 2020-08-11 NOTE — PROGRESS NOTES
[facility] CANCER CENTER  CANCER NETWORK OF [facility]  [facility] [address] [facility]  [address], [facility] [address] [facility] [address]  Dept: [phone]  Dept Fax: [phone]  Loc: [phone]    Subjective:      Chief Complaint: Skyler Schroeder is a 75 y.o. female with breast cancer. Chang Turk was noted to have an abnormal screening mammogram in May, 2019. Abnormalities were noted on the mammogram in both breasts. One located in the lower outer quadrant of the right breast, a second being located in the upper outer quadrant of the left breast and a third being located in the lower outer quadrant of the left breast.  And a second being located in the. She underwent further evaluation with ultrasound and biopsy of these areas of concern. Malignancy was found in the mass located in the lower outer quadrant of the left breast.  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 Context to this condition, the malignancy was noted to be estrogen receptor positive at 100%, progesterone receptor positive at 75%, and HER-2/steven was not amplified. Imaging of lymph nodes were unremarkable. At the time of the patient's abnormal mammogram she had no associated signs and symptoms of breast disease. She has no prior history of breast disease. Pathology from the other 2 biopsies obtained were to be benign findings. She was seen by Dr. Cameron Yoder and underwent surgical intervention. The patient had a lumpectomy and sentinel node evaluation completed at the time of her surgery. Definitive diagnosis confirmed an invasive ductal carcinoma involving the left breast.  All margins were clear of the malignancy. The malignancy measured 1.1 cm in size. [place] lymph node analysis was unremarkable for malignancy. In context to the pathology obtained she was also noted to have an Oncotype DX recurrence score of 19.   This would place the patient at low risk of recurrence and chemotherapy benefit would be less than 1%. Therefore, chemotherapy was not recommended to the patient. HPI: The patient is here today for follow examination. She is here for follow up of her history of breast cancer. The patient is currently on therapy with Arimidex. She tolerates this well and without side effects. Her overall health has been good. She has no signs or symptoms that are suggestive of recurrence of her breast cancer. The patient denies skeletal pain. She has not had fever or other signs of infection. The patient denies shortness of breath, chest pain, a change in bowel habits or a change in bladder habits. ECOG performance status is level 0. Her most recent mammogram was in 06/22/2020. This was reported as a benign appearing mammogram.  The results of the mammogram reviewed with the patient today. PMH, SH, and FH:  I reviewed the patients medication list and allergy list as noted on the electronic medical record. The PMH, SH and FH were also reviewed as noted on the EMR. Review of Systems  Constitutional: Negative. HENT: Negative. Eyes: Negative. Respiratory: Negative. Cardiovascular: Negative. Gastrointestinal: Negative. Genitourinary: Negative. Musculoskeletal: Negative. Skin: Negative. Neurological: Negative. Hematological: Negative. Psychiatric/Behavioral: Negative. Objective:   Physical Exam  Vitals:    08/12/20 1326   BP: (!) 174/79   Pulse: 82   Resp: 18   Temp: 98.4 °F (36.9 °C)   SpO2: 97%   Vitals reviewed and are stable. Constitutional: Elderly, frail. No acute distress. HENT: Normocephalic and atraumatic. Oral mucosa clear. Eyes: Pupils are equal and reactive. No scleral icterus. Neck: Bilateral appearance is symmetrical. No identifiable masses. Chest: Inspection and palpation of chest is normal.  Pulmonary: Effort normal. No respiratory distress. No rhonchi, rales or wheezing.   Cardiovascular: Regular rate and rhythm normal S1 and S2. Abdominal: Soft. Bowel sounds present. No hepatomegaly or splenomegaly. Musculoskeletal: Gait is abnormal. Muscle strength and tone decreased in all four extremities. Neurological: Alert and oriented to person, place, and time. Judgment and thought content normal.  Skin: Scattered ecchymosis noted on bilateral upper forearms. Psychiatric: Mood and affect appropriate for the clinical situation. Behavior is normal.     Data Analysis: The following studies were reviewed with the patient today:    Hematology 8/12/2020 6/3/2019   WBC 6.0 6.2   RBC 3.77 (L) 3.80 (L)   HGB 12.6 13.0   HCT 38.2 39.2    (H) 103.2 (H)   RDW 13.2     249     Assessment:   1. Invasive ductal carcinoma of the left breast, stage I, estrogen receptor positive, progesterone receptor, and HER-2/steven overexpression negative. 2.  Hypertension. Plan:   1. Monitor for recurrence of malignancy. 2.  Continue annual mammogram.  3.  Monitor blood pressure and follow up with primary care provider for further surveillance and management. Ynes Reeves M.D. Medical Director: [facility]  Cancer Network [facility]  [address], [address] [place] [address], [address] [place] [address], [address] [place] [address] of the [facility]      **This report has been created using voice recognition software. It may contain minor errors which are inherent in voice recognition technology. **

## 2020-08-12 ENCOUNTER — HOSPITAL ENCOUNTER (OUTPATIENT)
Dept: INFUSION THERAPY | Age: 75
Discharge: HOME | End: 2020-08-12
Payer: MEDICARE

## 2020-08-12 ENCOUNTER — OFFICE VISIT (OUTPATIENT)
Dept: ONCOLOGY | Age: 75
End: 2020-08-12
Payer: MEDICARE

## 2020-08-12 VITALS
WEIGHT: 180.8 LBS | BODY MASS INDEX: 33.27 KG/M2 | HEIGHT: 62 IN | OXYGEN SATURATION: 97 % | DIASTOLIC BLOOD PRESSURE: 79 MMHG | HEART RATE: 82 BPM | TEMPERATURE: 98.4 F | SYSTOLIC BLOOD PRESSURE: 174 MMHG | RESPIRATION RATE: 18 BRPM

## 2020-08-12 DIAGNOSIS — Z17.0 MALIGNANT NEOPLASM OF LOWER-OUTER QUADRANT OF LEFT BREAST OF FEMALE, ESTROGEN RECEPTOR POSITIVE (HCC): ICD-10-CM

## 2020-08-12 DIAGNOSIS — C50.512 MALIGNANT NEOPLASM OF LOWER-OUTER QUADRANT OF LEFT BREAST OF FEMALE, ESTROGEN RECEPTOR POSITIVE (HCC): ICD-10-CM

## 2020-08-12 LAB
ABSOLUTE IMMATURE GRANULOCYTE: 0.01 THOU/MM3 (ref 0–0.07)
BASINOPHIL, AUTOMATED: 0 % (ref 0–3)
BASOPHILS ABSOLUTE: 0 THOU/MM3 (ref 0–0.1)
CHLORIDE, WHOLE BLOOD: 108 MEQ/L (ref 98–109)
CREATININE, WHOLE BLOOD: 0.9 MG/DL (ref 0.5–1.2)
EOSINOPHILS ABSOLUTE: 0.1 THOU/MM3 (ref 0–0.4)
EOSINOPHILS RELATIVE PERCENT: 2 % (ref 0–4)
GFR, ESTIMATED ,CON: 65 ML/MIN/1.73M2
GLUCOSE, WHOLE BLOOD: 100 MG/DL (ref 70–108)
HCT VFR BLD CALC: 38.2 % (ref 37–47)
HEMOGLOBIN: 12.6 GM/DL (ref 12–16)
IMMATURE GRANULOCYTES: 0 %
IONIZED CALCIUM, WHOLE BLOOD: 1.17 MMOL/L (ref 1.12–1.32)
LYMPHOCYTES # BLD: 37 % (ref 15–47)
LYMPHOCYTES ABSOLUTE: 2.2 THOU/MM3 (ref 1–4.8)
MCH RBC QN AUTO: 33.4 PG (ref 26–33)
MCHC RBC AUTO-ENTMCNC: 33 GM/DL (ref 32.2–35.5)
MCV RBC AUTO: 101 FL (ref 81–99)
MONOCYTES ABSOLUTE: 0.6 THOU/MM3 (ref 0.4–1.3)
MONOCYTES: 11 % (ref 0–12)
PDW BLD-RTO: 13.2 % (ref 11.5–14.5)
PLATELET # BLD: 238 THOU/MM3 (ref 130–400)
PMV BLD AUTO: 8.7 FL (ref 9.4–12.4)
POTASSIUM, WHOLE BLOOD: 3.9 MEQ/L (ref 3.5–4.9)
RBC # BLD: 3.77 MILL/MM3 (ref 4.2–5.4)
SEG NEUTROPHILS: 50 % (ref 43–75)
SEGMENTED NEUTROPHILS ABSOLUTE COUNT: 3 THOU/MM3 (ref 1.8–7.7)
SODIUM, WHOLE BLOOD: 140 MEQ/L (ref 138–146)
WBC # BLD: 6 THOU/MM3 (ref 4.8–10.8)

## 2020-08-12 PROCEDURE — 84520 ASSAY OF UREA NITROGEN: CPT

## 2020-08-12 PROCEDURE — 99211 OFF/OP EST MAY X REQ PHY/QHP: CPT

## 2020-08-12 PROCEDURE — 1090F PRES/ABSN URINE INCON ASSESS: CPT | Performed by: INTERNAL MEDICINE

## 2020-08-12 PROCEDURE — 4040F PNEUMOC VAC/ADMIN/RCVD: CPT | Performed by: INTERNAL MEDICINE

## 2020-08-12 PROCEDURE — 1123F ACP DISCUSS/DSCN MKR DOCD: CPT | Performed by: INTERNAL MEDICINE

## 2020-08-12 PROCEDURE — G8400 PT W/DXA NO RESULTS DOC: HCPCS | Performed by: INTERNAL MEDICINE

## 2020-08-12 PROCEDURE — G8428 CUR MEDS NOT DOCUMENT: HCPCS | Performed by: INTERNAL MEDICINE

## 2020-08-12 PROCEDURE — 80076 HEPATIC FUNCTION PANEL: CPT

## 2020-08-12 PROCEDURE — G8417 CALC BMI ABV UP PARAM F/U: HCPCS | Performed by: INTERNAL MEDICINE

## 2020-08-12 PROCEDURE — 3017F COLORECTAL CA SCREEN DOC REV: CPT | Performed by: INTERNAL MEDICINE

## 2020-08-12 PROCEDURE — 36415 COLL VENOUS BLD VENIPUNCTURE: CPT

## 2020-08-12 PROCEDURE — 85025 COMPLETE CBC W/AUTO DIFF WBC: CPT

## 2020-08-12 PROCEDURE — 1036F TOBACCO NON-USER: CPT | Performed by: INTERNAL MEDICINE

## 2020-08-12 PROCEDURE — 99213 OFFICE O/P EST LOW 20 MIN: CPT | Performed by: INTERNAL MEDICINE

## 2020-08-12 PROCEDURE — 80047 BASIC METABLC PNL IONIZED CA: CPT

## 2020-08-12 PROCEDURE — 82374 ASSAY BLOOD CARBON DIOXIDE: CPT

## 2020-08-13 LAB
ALBUMIN SERPL-MCNC: 3.9 G/DL (ref 3.5–5.1)
ALP BLD-CCNC: 77 U/L (ref 38–126)
ALT SERPL-CCNC: 17 U/L (ref 11–66)
AST SERPL-CCNC: 21 U/L (ref 5–40)
BILIRUB SERPL-MCNC: 0.4 MG/DL (ref 0.3–1.2)
BILIRUBIN DIRECT: < 0.2 MG/DL (ref 0–0.3)
BUN BLDV-MCNC: 15 MG/DL (ref 7–22)
CO2: 23 MEQ/L (ref 23–33)
TOTAL PROTEIN: 6.9 G/DL (ref 6.1–8)

## 2020-12-17 ENCOUNTER — OFFICE VISIT (OUTPATIENT)
Dept: SURGERY | Age: 75
End: 2020-12-17
Payer: MEDICARE

## 2020-12-17 VITALS
WEIGHT: 181 LBS | DIASTOLIC BLOOD PRESSURE: 84 MMHG | HEART RATE: 86 BPM | RESPIRATION RATE: 20 BRPM | SYSTOLIC BLOOD PRESSURE: 124 MMHG | BODY MASS INDEX: 33.11 KG/M2 | TEMPERATURE: 97.4 F | OXYGEN SATURATION: 98 %

## 2020-12-17 PROCEDURE — 1123F ACP DISCUSS/DSCN MKR DOCD: CPT | Performed by: SURGERY

## 2020-12-17 PROCEDURE — 1090F PRES/ABSN URINE INCON ASSESS: CPT | Performed by: SURGERY

## 2020-12-17 PROCEDURE — G8417 CALC BMI ABV UP PARAM F/U: HCPCS | Performed by: SURGERY

## 2020-12-17 PROCEDURE — 1036F TOBACCO NON-USER: CPT | Performed by: SURGERY

## 2020-12-17 PROCEDURE — G8400 PT W/DXA NO RESULTS DOC: HCPCS | Performed by: SURGERY

## 2020-12-17 PROCEDURE — G8484 FLU IMMUNIZE NO ADMIN: HCPCS | Performed by: SURGERY

## 2020-12-17 PROCEDURE — G8427 DOCREV CUR MEDS BY ELIG CLIN: HCPCS | Performed by: SURGERY

## 2020-12-17 PROCEDURE — 99214 OFFICE O/P EST MOD 30 MIN: CPT | Performed by: SURGERY

## 2020-12-17 PROCEDURE — 4040F PNEUMOC VAC/ADMIN/RCVD: CPT | Performed by: SURGERY

## 2020-12-17 ASSESSMENT — ENCOUNTER SYMPTOMS
SORE THROAT: 0
COUGH: 0
NAUSEA: 0
SINUS PRESSURE: 0
BLOOD IN STOOL: 0
VOMITING: 0
COLOR CHANGE: 0
SHORTNESS OF BREATH: 0
WHEEZING: 0

## 2020-12-17 NOTE — PROGRESS NOTES
Ashley Ortega MD   General Surgery  Follow up Patient Evaluation in Office  Pt Name: Peg Clancy  Date of Birth 1945   Today's Date: 12/17/2020  Medical Record Number: [id number]  Referring Provider: No ref. provider found  Primary Care Provider: Jenkins Alpers, MD  Chief Complaint:  Chief Complaint   Patient presents with    6 Month Follow-Up     left breast cancer-- mammo 6/22       ASSESSMENT      1. History of left breast cancer    2. Breast cancer, stage 1, estrogen receptor positive, left ([address]    3. Essential hypertension         PLANS      1. Clinical breast examination performed, benign. 2.  Schedule mammography. Six-month follow-up imaging from January reviewed. 3.  Breast cancer survivorship plan discussed. 4.  Encourage self breast examinations monthly. Call for any changes. 5.  Follow-up surgical clinic in 6 months. SUBJECTIVE   History of present illness:  Annamaria Libman is a 67y.o. year old female who is presenting today in the office for follow-up of left breast cancer. She had an abnormal screening mammogram in May 2019. Abnormalities were seen in both breasts. She underwent diagnostic imaging, ultrasound and biopsies of the areas of concern. She had a invasive ductal carcinoma found in the lower outer quadrant of the left breast it was nuclear grade 2. Was ER +100% AR +75% and HER-2/steven was not amplified. Ultrasound imaging of the axilla revealed no suspicious findings. Biopsies of additional areas were benign. Annamaria Libman underwent a left lumpectomy and sentinel lymph node biopsy. Final pathology margins were negative. Invasive cancer was 1.1 cm in size. Closest margin was 5 mm to the skin. Lymph nodes were negative for malignancy. Oncotype DX recurrence score was 19. Patient opted out of radiation therapy. She was started on anastrozole but 3 days after starting she had issues with blood pressure and went off the aromatase inhibitor. She never resumed. succinate (TOPROL XL) 50 MG extended release tablet Take 50 mg by mouth daily      Multiple Vitamins-Minerals (THERAPEUTIC MULTIVITAMIN-MINERALS) tablet Take 1 tablet by mouth daily      montelukast (SINGULAIR) 10 MG tablet Take 10 mg by mouth nightly       No current facility-administered medications for this visit.       Allergies     Allergies   Allergen Reactions    Aspirin Hives    Ceftin [Cefuroxime Axetil] Rash    Codeine Rash    Erythromycin Rash    Nexium [Esomeprazole] Diarrhea    Penicillins Rash    Cephalosporins Rash    Milk Protein Nausea Only    Reglan [Metoclopramide] Diarrhea    Seasonal Other (See Comments)    Shellfish-Derived Products Nausea And Vomiting       Family History  Family History   Problem Relation Age of Onset    Colon Cancer Brother     Lung Cancer Brother     Depression Mother     Stroke Father     Heart Disease Sister     No Known Problems Maternal Grandmother     No Known Problems Maternal Grandfather     No Known Problems Paternal Grandmother     No Known Problems Paternal Grandfather     Breast Cancer Neg Hx     Cancer Neg Hx        SocialHistory  Social History     Socioeconomic History    Marital status:      Spouse name: Not on file    Number of children: 10    Years of education: Not on file    Highest education level: Not on file   Occupational History    Occupation: retired   Social Needs    Financial resource strain: Not on file    Food insecurity     Worry: Not on file     Inability: Not on file   [employer] needs     Medical: Not on file     Non-medical: Not on file   Tobacco Use    Smoking status: Never Smoker    Smokeless tobacco: Never Used   Substance and Sexual Activity    Alcohol use: Yes     Comment: glass wine nightly    Drug use: Never    Sexual activity: Yes     Partners: Male   Lifestyle    Physical activity     Days per week: Not on file     Minutes per session: Not on file    Stress: Not on file Relationships    Social connections     Talks on phone: Not on file     Gets together: Not on file     Attends Spiritism service: Not on file     Active member of club or organization: Not on file     Attends meetings of clubs or organizations: Not on file     Relationship status: Not on file    Intimate partner violence     Fear of current or ex partner: Not on file     Emotionally abused: Not on file     Physically abused: Not on file     Forced sexual activity: Not on file   Other Topics Concern    Not on file   Social History Narrative    Not on file           Review of Systems  Review of Systems   Constitutional: Negative for chills, fatigue, fever and unexpected weight change. HENT: Negative for congestion, sinus pressure and sore throat. Reports congestion due to seasonal allergies. Eyes: Negative for visual disturbance. Respiratory: Negative for cough, shortness of breath and wheezing. Cardiovascular: Negative for chest pain and palpitations. Gastrointestinal: Negative for abdominal pain, blood in stool, nausea and vomiting. History of irritable bowel syndrome   Endocrine: Negative for cold intolerance, heat intolerance and polydipsia. Genitourinary: Negative for dysuria, flank pain and hematuria. Musculoskeletal: Negative for back pain, gait problem, joint swelling and myalgias. Skin: Negative for color change and rash. Allergic/Immunologic: Positive for environmental allergies. Negative for immunocompromised state. Neurological: Negative for dizziness, tremors, seizures, speech difficulty and headaches. Hematological: Does not bruise/bleed easily. Psychiatric/Behavioral: Negative for behavioral problems, confusion and suicidal ideas.        OBJECTIVE     /84 (Site: Right Upper Arm, Position: Sitting, Cuff Size: Medium Adult)   Pulse 86   Temp 97.4 °F (36.3 °C) (Temporal)   Resp 20   Wt 181 lb (82.1 kg)   SpO2 98%   BMI 33.11 kg/m²      Physical Exam Vitals signs reviewed. Constitutional:       Appearance: Normal appearance. She is well-developed. She is not ill-appearing or diaphoretic. HENT:      Head: Normocephalic and atraumatic. Nose: Nose normal. No congestion or rhinorrhea. Mouth/Throat:      Pharynx: No posterior oropharyngeal erythema. Eyes:      General: No scleral icterus. Extraocular Movements: Extraocular movements intact. Pupils: Pupils are equal, round, and reactive to light. Neck:      Musculoskeletal: Neck supple. No muscular tenderness. Vascular: No JVD. Trachea: No tracheal deviation. Cardiovascular:      Rate and Rhythm: Normal rate and regular rhythm. Heart sounds: Normal heart sounds. No murmur. Pulmonary:      Effort: Pulmonary effort is normal. No respiratory distress. Breath sounds: Normal breath sounds. No wheezing. Chest:      Chest wall: No tenderness. Breasts:         Right: No swelling, bleeding, inverted nipple, mass, nipple discharge or skin change. Left: No swelling, bleeding, inverted nipple, mass, nipple discharge or skin change. Abdominal:      General: There is no distension. Palpations: Abdomen is soft. There is no mass. Tenderness: There is no abdominal tenderness. Musculoskeletal:         General: No deformity. Lymphadenopathy:      Cervical: No cervical adenopathy. Right cervical: No superficial, deep or posterior cervical adenopathy. Left cervical: No superficial, deep or posterior cervical adenopathy. Upper Body:      Right upper body: No supraclavicular, axillary or pectoral adenopathy. Left upper body: No supraclavicular, axillary or pectoral adenopathy. Skin:     General: Skin is warm and dry. Findings: No rash. Neurological:      Mental Status: She is alert and oriented to person, place, and time. Cranial Nerves: No cranial nerve deficit.    Psychiatric:         Behavior: Behavior normal. Thought Content: Thought content normal.         Lab Results   Component Value Date    WBC 6.0 2020    HGB 12.6 2020    HCT 38.2 2020     2020    ALT 17 2020    AST 21 2020     2020    K 3.9 2020     2019    CREATININE 0.9 2020    BUN 15 2020    CO2 23 2020      Performed by: [facility]     Sandilinn Rodgers               [id number]  Assoc.                                              Page 1 of 1  [address]  [address], [place] [address]                                                      PROC: 2019  [facility]                                    RECV: 2019  [address]                                    RPTD: 2019  [address], [place] [address]                      MRN:  [id number]    LOC: ASOR                      ACCT: [de-identified]  SEX: F                      : 1945  AGE: 75 Y                         PATHOLOGY REPORT                      ATTN: TARA EASTON  [de-identified]                  REQ: TARA EASTON        Clinical Information: INVASIVE DUCTAL CARCINOMA LEFT BREAST    ADDENDUM REPORT 19, 19:    FINAL DIAGNOSIS:  Left breast, excision:   Invasive ductal carcinoma, Fernando grade 2 (pT1c).  Margins are negative.    Distance to closest margin: 5 mm (skin margin). COMMENT 19:  The slides and report were reviewed by Dr. Maria C Covington and  archival tissue (block A4) was selected for the following testing:  Oncotype DX (ordered by Dr. Keenan Damon).   Please see the separately issued  reference report for the results to these test(s). ADDENDUM REPORT 19:  Oncotype DX Breast Cancer Assay (RT-PCR) performed by [employer]  Breast Cancer Recurrence Score:   19    Please see full reference report under Media tab, Pathology, in [address]. Specimen:  EXCISION OF BREAST, LEFT      Gross Examination:  The container is labeled Fatuma Setter, left breast tissue.   Received fresh on an x-ray grid is an oriented lumpectomy specimen  including a localization wire.  The specimen measures 4 cm from medial  to lateral margins, 4 cm from the cranial to caudal margins and about  2.4 cm from the skin to deep margin.  The accompanying x-ray indicates  a clip at grid coordinate C3.  The deep margin is inked blue.  The skin  margin is inked yellow.  The cranial and medial margins are inked  black; and the caudal and lateral margins are inked green.  There is a  firm, white, moderately circumscribed lesion grossly measuring about  1.2 x 0.9 x 1.5 cm.  The lesion is focally 0.3 cm from the skin margin.   The biopsy site is 0.2 cm from the deep margin.  Representative  sections are submitted:  Cassette #1 - medial margin; cassette #2 -  cranial, skin and deep margin; cassette #3 - caudal, skin and deep  margin; cassette #4 - skin and deep margin with mass; cassette #5 -  cranial margin; cassette #6 - caudal margin; cassette #7 - cranial,  skin and deep margin; cassette #8 - caudal, skin and deep margin;  cassette #9 - lateral margin.  ss.  MTK/DKR:[id number]    Fixative:  10% neutral buffered formalin  Tissue removal time:  09:34  Tissue fixation time:  10:05  Total fixation time: 9 hours 55 minutes    Microscopic Examination:  Invasive carcinoma the breast: Resection  Procedure: Excision  Specimen laterality: Left  Tumor site: Not specified  Tumor size: Greatest dimension of largest invasive focus: 11 mm  Histologic type:  Invasive ductal carcinoma, not otherwise specified  Histologic grade-Fernando histologic score  Glandular/tubular differentiation: Score 3  Nuclear pleomorphism: Score 2  Mitotic rate: Score 1  Overall grade: Grade 2    Ductal carcinoma in situ: Not identified  Margins  Invasive carcinoma margins  Uninvolved by invasive carcinoma   Distance from closest margin: 5 mm (skin margin)    Regional lymph nodes: No nodes submitted or found  Treatment effect: No known presurgical therapy Pathologic stage classification, AJCC eighth edition  Primary tumor  pT1c: Tumor >10 mm but less than 20 mm in greatest dimension  Regional lymph nodes  pNX: Regional lymph nodes cannot be assessed    Previously performed breast biomarkers [id number]; 5/23/2019*  Estrogen Receptor: (Clone SP1), [address]  Positive 100% of cells  Intensity of Staining:   Strong    Progesterone Receptor: (Clone 1E2), [facility]  Positive 75% of cells  Intensity of Staining:  Intermediate    Ki-67 (clone 30-9)      Percentage of positive nuclei:  5%              Favorable <10%              Borderline 10-20%              Unfavorable >20%    2018 ASCO/ CAP HER2 dual PITO Group # Inform HER2 Dual PITO  [address]  ( X ) Group 5: HER2 PITO NEGATIVE    HER2/CEP17 Ratio <2.0 and <4.0 HER2 signals/cell    Number of observers: 1  Number of invasive tumor cells counted: 20  Using dual probe assay:   Average number of HER2 signals per cell: 2   Average number of CEP17 signals per cell: 2   HER2/CEP17 ratio: 1 to 1    External Controls: Adequate  Internal Controls: Adequate  Standard Assay Conditions: Met  Staining Method Used:   Formalin fixation   Antigen retrieval type:  Cell Conditioning 1, mild alejandro   Time in antigen retrieval:  30 minutes   Detection system type:   DAB Ultraview kit    [id number]  [id number]                                                    <Sign Out Dr. June Zamora M.D., F.C. A. P      [facility]/ [address] [address]  Printed on:  6/26/2019  [address]  [facility], [employer] [facility]  Original print date: 06/26/2019   Lab and Collection     Surgical Pathology - 6/12/2019   Result History     Surgical Pathology on 6/26/2019 - Result Edited   Result Information     Status: Edited Result - FINAL (Collected: 6/12/2019 10:31) Provider Status: Reviewed   All Reviewers List     Izzy Hill MD on 6/26/2019 15:08   Izzy Hill MD on 2/06/4099 06:26   Click to Print Result        Narrative       IMPRESSION: Mammogram BI-RADS: 2: Benign       There is no mammographic evidence of malignancy. A 1 year    screening mammogram is recommended.  The patient has been entered    into our database and they will receive a notification when they    are due for their next exam.     The patient was notified of the results.         #[id number] - FAISAL KAY DIGITAL DIAGNOSTIC BILATERAL   BILATERAL DIGITAL DIAGNOSTIC MAMMOGRAM 3D/2D WITH CAD: 6/22/2020   CLINICAL: Tomosynthesis.  Personal history of breast cancer.         Comparison is made to exams dated:  5/23/2019 mammogram - [address] and 5/14/2019 mammogram - [address]     There are scattered fibroglandular elements in both breasts that    could obscure a lesion on mammography.     Current study was also evaluated with a Computer Aided Detection    (CAD) system.     There are benign vascular calcifications both breasts. Cristel Cheers    also are benign scattered calcifications both breasts.     Additionally, there is a biopsy clip both breasts.  Additionally,    there alsoare post operative findings left breast.     No significant masses, calcifications, or other findings are seen    in either breast.     There has been no significant interval change.               Colton Gr M.D.             rd/penrad:6/22/2020 11:06:36     copy to: Sara Ramesh and THE [facility], ph:    [phone], fax: [phone]   copy to: Dr. Girma Casiano M.D., [facility], ph:    [phone], fax: [phone]       Imaging Technologist: Jennifer WOODS(PUNEET)(M), [address] [place] [address]   letter sent: Normal-All Doctor Names

## 2020-12-19 ASSESSMENT — ENCOUNTER SYMPTOMS
ABDOMINAL PAIN: 0
BACK PAIN: 0

## 2021-02-10 ENCOUNTER — OFFICE VISIT (OUTPATIENT)
Dept: ONCOLOGY | Age: 76
End: 2021-02-10
Payer: MEDICARE

## 2021-02-10 ENCOUNTER — HOSPITAL ENCOUNTER (OUTPATIENT)
Dept: INFUSION THERAPY | Age: 76
Discharge: HOME | End: 2021-02-10
Payer: MEDICARE

## 2021-02-10 VITALS
HEIGHT: 62 IN | TEMPERATURE: 97.5 F | SYSTOLIC BLOOD PRESSURE: 190 MMHG | WEIGHT: 184.6 LBS | OXYGEN SATURATION: 100 % | HEART RATE: 84 BPM | DIASTOLIC BLOOD PRESSURE: 100 MMHG | RESPIRATION RATE: 18 BRPM | BODY MASS INDEX: 33.97 KG/M2

## 2021-02-10 DIAGNOSIS — Z17.0 MALIGNANT NEOPLASM OF LOWER-OUTER QUADRANT OF LEFT BREAST OF FEMALE, ESTROGEN RECEPTOR POSITIVE (HCC): ICD-10-CM

## 2021-02-10 DIAGNOSIS — Z12.31 VISIT FOR SCREENING MAMMOGRAM: Primary | ICD-10-CM

## 2021-02-10 DIAGNOSIS — I10 HYPERTENSION, UNSPECIFIED TYPE: ICD-10-CM

## 2021-02-10 DIAGNOSIS — Z12.31 ENCOUNTER FOR SCREENING MAMMOGRAM FOR MALIGNANT NEOPLASM OF BREAST: ICD-10-CM

## 2021-02-10 DIAGNOSIS — C50.512 MALIGNANT NEOPLASM OF LOWER-OUTER QUADRANT OF LEFT BREAST OF FEMALE, ESTROGEN RECEPTOR POSITIVE (HCC): ICD-10-CM

## 2021-02-10 PROCEDURE — 99211 OFF/OP EST MAY X REQ PHY/QHP: CPT

## 2021-02-10 PROCEDURE — G8484 FLU IMMUNIZE NO ADMIN: HCPCS | Performed by: INTERNAL MEDICINE

## 2021-02-10 PROCEDURE — 4040F PNEUMOC VAC/ADMIN/RCVD: CPT | Performed by: INTERNAL MEDICINE

## 2021-02-10 PROCEDURE — G8427 DOCREV CUR MEDS BY ELIG CLIN: HCPCS | Performed by: INTERNAL MEDICINE

## 2021-02-10 PROCEDURE — G8400 PT W/DXA NO RESULTS DOC: HCPCS | Performed by: INTERNAL MEDICINE

## 2021-02-10 PROCEDURE — 99213 OFFICE O/P EST LOW 20 MIN: CPT | Performed by: INTERNAL MEDICINE

## 2021-02-10 PROCEDURE — 1036F TOBACCO NON-USER: CPT | Performed by: INTERNAL MEDICINE

## 2021-02-10 PROCEDURE — G8417 CALC BMI ABV UP PARAM F/U: HCPCS | Performed by: INTERNAL MEDICINE

## 2021-02-10 PROCEDURE — 1123F ACP DISCUSS/DSCN MKR DOCD: CPT | Performed by: INTERNAL MEDICINE

## 2021-02-10 PROCEDURE — 1090F PRES/ABSN URINE INCON ASSESS: CPT | Performed by: INTERNAL MEDICINE

## 2021-02-10 NOTE — PROGRESS NOTES
[facility]  CANCER NETWORK OF [facility]  [facility] [address] [facility]  [address], [facility] [place] [address] [facility] [address]  Dept: [phone]  Dept Fax: [phone]  Loc: [phone]    Subjective:      Chief Complaint: Yaz Arguelles is a 67 y.o. female with breast cancer. Raúl Padilla was noted to have an abnormal screening mammogram in May, 2019. Abnormalities were noted on the mammogram in both breasts. One located in the lower outer quadrant of the right breast, a second being located in the upper outer quadrant of the left breast and a third being located in the lower outer quadrant of the left breast. She underwent further evaluation with ultrasound and biopsy of these areas of concern. Malignancy was found in the mass located in the lower outer quadrant of the left breast.  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 Context to this condition, the malignancy was noted to be estrogen receptor positive at 100%, progesterone receptor positive at 75%, and HER-2/steven was not amplified. Imaging of lymph nodes were unremarkable. At the time of the patient's abnormal mammogram she had no associated signs and symptoms of breast disease. She has no prior history of breast disease. Pathology from the other 2 biopsies obtained were to be benign findings. She was seen by Dr. Elise Prince and underwent surgical intervention. The patient had a lumpectomy and sentinel node evaluation completed at the time of her surgery. Definitive diagnosis confirmed an invasive ductal carcinoma involving the left breast.  All margins were clear of the malignancy. The malignancy measured 1.1 cm in size. Nice lymph node analysis was unremarkable for malignancy. In context to the pathology obtained she was also noted to have an Oncotype DX recurrence score of 19.   This would place the patient at low risk of recurrence and chemotherapy benefit would be less than 1%. Therefore, chemotherapy was not recommended to the patient. HPI: The patient is here today for follow examination. She is here for follow up of her history of breast cancer. The patient did proceed with a trial of Arimidex. She did not tolerate this well and elected to discontinue this therapy. Her overall health has been good. She has no signs or symptoms that are suggestive of recurrence of her breast cancer. The patient denies skeletal pain. She has not had fever or other signs of infection. The patient denies shortness of breath, chest pain, a change in bowel habits or a change in bladder habits. ECOG performance status is level 0. Her most recent mammogram was in June, 2020. This was reported as a benign appearing mammogram.  The results of the mammogram reviewed with the patient today. The patient's blood pressure is modestly elevated. She will continue to monitor her blood pressure and follow-up with her primary care provider for further management. PMH, SH, and FH:  I reviewed the patients medication list and allergy list as noted on the electronic medical record. The PMH, SH and FH were also reviewed as noted on the EMR. Review of Systems  Constitutional: Negative. HENT: Negative. Eyes: Negative. Respiratory: Negative. Cardiovascular: Negative. Gastrointestinal: Negative. Genitourinary: Negative. Musculoskeletal: Negative. Skin: Negative. Neurological: Negative. Hematological: Negative. Psychiatric/Behavioral: Negative. Objective:   Physical Exam  Vitals:    02/10/21 1354   BP: (!) 190/100   Pulse: 84   Resp: 18   Temp: 97.5 °F (36.4 °C)   SpO2: 100%   Vitals reviewed and are stable. Constitutional: Well-developed. No acute distress. HENT: Normocephalic and atraumatic. Eyes: Pupils appear equal and reactive. Neck: Overall appearance is symmetrical. No identifiable masses.   Pulmonary: Effort normal. No respiratory distress. .  Neurological: Alert and oriented to person, place, and time. Judgment and thought content normal.  Skin: Skin is warm and dry. No rash. Psychiatric: Mood and affect appropriate for the clinical situation. Assessment:   1. Invasive ductal carcinoma of the left breast, stage I, estrogen receptor positive, progesterone receptor, and HER-2/steven overexpression negative. 2.  Hypertension. Plan:   1. Monitor for recurrence of malignancy. 2.  Schedule  annual mammogram in June, 2021. 3.  Monitor blood pressure and follow up with primary care provider for further surveillance and management. Mariangel Monahan M.D. Medical Director: [facility]  Cancer Network [facility]  [address] [employer] [facility], [address] [place] [address], [address] [place] [address], [address] [place] [address] of the [facility] at [facility]      **This report has been created using voice recognition software. It may contain minor errors which are inherent in voice recognition technology. **

## 2021-04-14 ENCOUNTER — TELEPHONE (OUTPATIENT)
Dept: ONCOLOGY | Age: 76
End: 2021-04-14

## 2021-04-14 NOTE — TELEPHONE ENCOUNTER
Patient called and stated that she will call and schedule mammo at [facility] before she sees Dr Shiva Vargas in June. Ashish Pereyra canceled at [facility].

## 2021-06-07 ENCOUNTER — HOSPITAL ENCOUNTER (OUTPATIENT)
Dept: WOMENS IMAGING | Age: 76
Discharge: HOME | End: 2021-06-07
Payer: MEDICARE

## 2021-06-07 DIAGNOSIS — Z12.31 VISIT FOR SCREENING MAMMOGRAM: ICD-10-CM

## 2021-06-07 PROCEDURE — 77063 BREAST TOMOSYNTHESIS BI: CPT

## 2021-06-24 ENCOUNTER — OFFICE VISIT (OUTPATIENT)
Dept: SURGERY | Age: 76
End: 2021-06-24
Payer: MEDICARE

## 2021-06-24 VITALS
SYSTOLIC BLOOD PRESSURE: 138 MMHG | WEIGHT: 183 LBS | BODY MASS INDEX: 33.68 KG/M2 | DIASTOLIC BLOOD PRESSURE: 60 MMHG | RESPIRATION RATE: 20 BRPM | OXYGEN SATURATION: 90 % | TEMPERATURE: 97.5 F | HEIGHT: 62 IN | HEART RATE: 89 BPM

## 2021-06-24 DIAGNOSIS — Z85.3 HISTORY OF LEFT BREAST CANCER: Primary | ICD-10-CM

## 2021-06-24 DIAGNOSIS — Z17.0 BREAST CANCER, STAGE 1, ESTROGEN RECEPTOR POSITIVE, LEFT (HCC): ICD-10-CM

## 2021-06-24 DIAGNOSIS — C50.912 BREAST CANCER, STAGE 1, ESTROGEN RECEPTOR POSITIVE, LEFT (HCC): ICD-10-CM

## 2021-06-24 DIAGNOSIS — I10 ESSENTIAL HYPERTENSION: ICD-10-CM

## 2021-06-24 PROCEDURE — 1090F PRES/ABSN URINE INCON ASSESS: CPT | Performed by: SURGERY

## 2021-06-24 PROCEDURE — 99213 OFFICE O/P EST LOW 20 MIN: CPT | Performed by: SURGERY

## 2021-06-24 PROCEDURE — 4040F PNEUMOC VAC/ADMIN/RCVD: CPT | Performed by: SURGERY

## 2021-06-24 PROCEDURE — G8417 CALC BMI ABV UP PARAM F/U: HCPCS | Performed by: SURGERY

## 2021-06-24 PROCEDURE — G8427 DOCREV CUR MEDS BY ELIG CLIN: HCPCS | Performed by: SURGERY

## 2021-06-24 PROCEDURE — 1036F TOBACCO NON-USER: CPT | Performed by: SURGERY

## 2021-06-24 PROCEDURE — 1123F ACP DISCUSS/DSCN MKR DOCD: CPT | Performed by: SURGERY

## 2021-06-24 PROCEDURE — G8400 PT W/DXA NO RESULTS DOC: HCPCS | Performed by: SURGERY

## 2021-06-24 RX ORDER — LEVOCETIRIZINE DIHYDROCHLORIDE 5 MG/1
5 TABLET, FILM COATED ORAL NIGHTLY
COMMUNITY

## 2021-06-24 ASSESSMENT — ENCOUNTER SYMPTOMS
ABDOMINAL PAIN: 0
WHEEZING: 0
BACK PAIN: 0
SORE THROAT: 0
SHORTNESS OF BREATH: 0
NAUSEA: 0
BLOOD IN STOOL: 0
COUGH: 0
SINUS PRESSURE: 0
VOMITING: 0
COLOR CHANGE: 0

## 2021-06-24 NOTE — PROGRESS NOTES
Roman Vitale MD   General Surgery  Follow up Patient Evaluation in Office  Pt Name: Macy Otero  Date of Birth 1945   Today's Date: 6/24/2021  Medical Record Number: [id number]  Referring Provider: No ref. provider found  Primary Care Provider: Tamela Portillo MD  Chief Complaint:  Chief Complaint   Patient presents with    6 Month Follow-Up     mammo 6/7/21 left breast cancer       ASSESSMENT      1. History of left breast cancer    2. Breast cancer, stage 1, estrogen receptor positive, left ([address]    3. Essential hypertension         PLANS      1. Clinical breast examination performed, benign. 2.  Screening mammography reviewed June 2021. No mammographic evidence of malignancy. 3.  Breast cancer survivorship plan again discussed. 4.  Encourage self breast examinations monthly. Call for any changes. 5.  Follow-up surgical clinic 1 year. 6.  Continue follow-up with medical oncology as well      SUBJECTIVE   History of present illness:  Ashlee Hoyos is a 67y.o. year old female who is presenting today in the office for follow-up of left breast cancer. She had an abnormal screening mammogram in May 2019. Abnormalities were seen in both breasts. She underwent diagnostic imaging, ultrasound and biopsies of the areas of concern. She had a invasive ductal carcinoma found in the lower outer quadrant of the left breast it was nuclear grade 2. Was ER +100% SC +75% and HER-2/steven was not amplified. Ultrasound imaging of the axilla revealed no suspicious findings. Biopsies of additional areas were benign. Ashlee Hoyos underwent a left lumpectomy and sentinel lymph node biopsy. Final pathology margins were negative. Invasive cancer was 1.1 cm in size. Closest margin was 5 mm to the skin. Lymph nodes were negative for malignancy. Oncotype DX recurrence score was 19. Patient opted out of radiation therapy.   She was started on anastrozole but 3 days after starting she had issues with blood pressure and went off the aromatase inhibitor. She never resumed. Interval history: Gray Martinez is doing well. Clinical breast examination today is benign. She has good range of motion upper extremity no signs or symptoms of lymphedema. She continues to follow with medical oncology, Dr. Virgil Hahn as well. She has been having some dizziness and is having medical work-up for this. It is felt to be possibly related to her vision she is scheduled with the retinal specialist.  She has had her carotid evaluated in the past had no significant disease. She is undergoing physical therapy. Complaints of dizziness some visual disturbance. Denies any chest pain or shortness of breath. .. She had her children between the ages of 20 and 27. She took oral contraceptive pills briefly for about 3 years. No family history of breast or ovarian malignancy. Brother history of colon cancer. She denies prior breast biopsies.     Past Medical History  Past Medical History:   Diagnosis Date    Breast cancer ([address]     left breat     Diverticulitis     GERD (gastroesophageal reflux disease)     Hypertension     IBS (irritable bowel syndrome)     Invasive ductal carcinoma of breast ([address]     left    Macular degeneration        Past Surgical History  Past Surgical History:   Procedure Laterality Date    BREAST BIOPSY Left 2019    [facility]-    BREAST BIOPSY Right     stereotatic    BREAST LUMPECTOMY Left 2019    LEFT BREAST LUMPECTOMY WITH PREOP NEEDLE LOC performed by Inocencia Brown MD at [facility]  10/19/2007    [facility]      Dr Pop Petties in [facility] [address]    COLONOSCOPY  2017    Dr Narendra Shrestha Bilateral [address]       Medications  Current Outpatient Medications   Medication Sig Dispense Refill  Cholecalciferol (VITAMIN D3) 1.25 MG (40732 UT) TABS Take by mouth Every 3 weeks      levocetirizine (XYZAL) 5 MG tablet Take 5 mg by mouth nightly      chlordiazePOXIDE-clidinium (LIBRAX) 5-2.5 MG per capsule Take 1 capsule by mouth daily as needed.  amLODIPine (NORVASC) 5 MG tablet Take 7.5 mg by mouth daily       azelastine (ASTELIN) 0.1 % nasal spray 1 spray by Nasal route 2 times daily Use in each nostril as directed      famotidine (PEPCID) 20 MG tablet Take 20 mg by mouth 2 times daily      metoprolol succinate (TOPROL XL) 50 MG extended release tablet Take 50 mg by mouth daily      Multiple Vitamins-Minerals (THERAPEUTIC MULTIVITAMIN-MINERALS) tablet Take 1 tablet by mouth daily       No current facility-administered medications for this visit.      Allergies     Allergies   Allergen Reactions    Aspirin Hives    Ceftin [Cefuroxime Axetil] Rash    Codeine Rash    Erythromycin Rash    Nexium [Esomeprazole] Diarrhea    Penicillins Rash    Cephalosporins Rash    Milk Protein Nausea Only    Reglan [Metoclopramide] Diarrhea    Seasonal Other (See Comments)    Shellfish-Derived Products Nausea And Vomiting       Family History  Family History   Problem Relation Age of Onset    Colon Cancer Brother     Lung Cancer Brother     Depression Mother     Stroke Father     Heart Disease Sister     No Known Problems Maternal Grandmother     No Known Problems Maternal Grandfather     No Known Problems Paternal Grandmother     No Known Problems Paternal Grandfather     Breast Cancer Neg Hx     Cancer Neg Hx        SocialHistory  Social History     Socioeconomic History    Marital status:      Spouse name: Not on file    Number of children: 10    Years of education: Not on file    Highest education level: Not on file   Occupational History    Occupation: retired   Tobacco Use    Smoking status: Never Smoker    Smokeless tobacco: Never Used   Vaping Use    Vaping Use: Never used Allergic/Immunologic: Negative for environmental allergies and immunocompromised state. Neurological: Positive for dizziness. Negative for tremors, seizures, speech difficulty and headaches. Hematological: Does not bruise/bleed easily. Psychiatric/Behavioral: Negative for behavioral problems, confusion and suicidal ideas. OBJECTIVE     /60 (Site: Right Upper Arm, Position: Sitting, Cuff Size: Medium Adult)   Pulse 89   Temp 97.5 °F (36.4 °C) (Temporal)   Resp 20   Ht 5' 2\" (1.575 m)   Wt 183 lb (83 kg)   SpO2 90%   BMI 33.47 kg/m²      Physical Exam  Vitals reviewed. Constitutional:       Appearance: Normal appearance. She is well-developed. She is not ill-appearing or diaphoretic. HENT:      Head: Normocephalic and atraumatic. Nose: Nose normal. No congestion or rhinorrhea. Mouth/Throat:      Pharynx: No posterior oropharyngeal erythema. Eyes:      General: No scleral icterus. Extraocular Movements: Extraocular movements intact. Pupils: Pupils are equal, round, and reactive to light. Neck:      Vascular: No JVD. Trachea: No tracheal deviation. Cardiovascular:      Rate and Rhythm: Normal rate and regular rhythm. Heart sounds: Normal heart sounds. No murmur heard. Pulmonary:      Effort: Pulmonary effort is normal. No respiratory distress. Breath sounds: Normal breath sounds. No wheezing. Chest:      Chest wall: No tenderness. Breasts:         Right: No swelling, bleeding, inverted nipple, mass, nipple discharge or skin change. Left: No swelling, bleeding, inverted nipple, mass, nipple discharge or skin change. Abdominal:      General: There is no distension. Palpations: Abdomen is soft. There is no mass. Tenderness: There is no abdominal tenderness. Musculoskeletal:         General: No deformity. Cervical back: Neck supple. No muscular tenderness. Lymphadenopathy:      Cervical: No cervical adenopathy. Right cervical: No superficial, deep or posterior cervical adenopathy. Left cervical: No superficial, deep or posterior cervical adenopathy. Upper Body:      Right upper body: No supraclavicular, axillary or pectoral adenopathy. Left upper body: No supraclavicular, axillary or pectoral adenopathy. Skin:     General: Skin is warm and dry. Coloration: Skin is not jaundiced or pale. Findings: No rash. Neurological:      Mental Status: She is alert and oriented to person, place, and time. Cranial Nerves: No cranial nerve deficit. Psychiatric:         Behavior: Behavior normal.         Thought Content: Thought content normal.         Lab Results   Component Value Date    WBC 6.0 2020    HGB 12.6 2020    HCT 38.2 2020     2020    ALT 17 2020    AST 21 2020     2020    K 3.9 2020     2019    CREATININE 0.9 2020    BUN 15 2020    CO2 23 2020      Performed by: [facility]     Mortimer Horsfall               [id number]  Assoc.                                              Page 1 of 1  [address]  [facility], [place] [address]                                                      PROC: 2019  [facility]                                    RECV: 2019  [address]                                    RPTD: 2019  [facility], [place] [address]                      MRN:  [id number]    LOC: ASOR                      ACCT: [de-identified]  SEX: F                      : 1945  AGE: 75 Y                         PATHOLOGY REPORT                      ATTN: TARA Dias                  REQ: TARA EASTON        Clinical Information: INVASIVE DUCTAL CARCINOMA LEFT BREAST    ADDENDUM REPORT 19, 19:    FINAL DIAGNOSIS:  Left breast, excision:   Invasive ductal carcinoma, [place] grade 2 (pT1c).  Margins are negative.    Distance to closest margin: 5 mm (skin margin). COMMENT 6/18/19:  The slides and report were reviewed by Dr. Sajan Love and  archival tissue (block A4) was selected for the following testing:  Oncotype DX (ordered by Dr. Allie Romero).   Please see the separately issued  reference report for the results to these test(s). ADDENDUM REPORT 6/26/19:  Oncotype DX Breast Cancer Assay (RT-PCR) performed by [employer]  Breast Cancer Recurrence Score:   19    Please see full reference report under Media tab, Pathology, in [address]. Specimen:  EXCISION OF BREAST, LEFT      Gross Examination:  The container is labeled Renetta Ferrari, left breast tissue.   Received fresh on an x-ray grid is an oriented lumpectomy specimen  including a localization wire.  The specimen measures 4 cm from medial  to lateral margins, 4 cm from the cranial to caudal margins and about  2.4 cm from the skin to deep margin.  The accompanying x-ray indicates  a clip at grid coordinate C3.  The deep margin is inked blue.  The skin  margin is inked yellow.  The cranial and medial margins are inked  black; and the caudal and lateral margins are inked green.  There is a  firm, white, moderately circumscribed lesion grossly measuring about  1.2 x 0.9 x 1.5 cm.  The lesion is focally 0.3 cm from the skin margin.   The biopsy site is 0.2 cm from the deep margin.  Representative  sections are submitted:  Cassette #1 - medial margin; cassette #2 -  cranial, skin and deep margin; cassette #3 - caudal, skin and deep  margin; cassette #4 - skin and deep margin with mass; cassette #5 -  cranial margin; cassette #6 - caudal margin; cassette #7 - cranial,  skin and deep margin; cassette #8 - caudal, skin and deep margin;  cassette #9 - lateral margin.  aden.  EVI/VICTOR HUGOR:[id number]    Fixative:  10% neutral buffered formalin  Tissue removal time:  09:34  Tissue fixation time:  10:05  Total fixation time: 9 hours 55 minutes    Microscopic Examination:  Invasive carcinoma the breast: Resection  Procedure: Excision Specimen laterality: Left  Tumor site: Not specified  Tumor size: Greatest dimension of largest invasive focus: 11 mm  Histologic type:  Invasive ductal carcinoma, not otherwise specified  Histologic grade-Fernando histologic score  Glandular/tubular differentiation: Score 3  Nuclear pleomorphism: Score 2  Mitotic rate: Score 1  Overall grade: Grade 2    Ductal carcinoma in situ: Not identified  Margins  Invasive carcinoma margins  Uninvolved by invasive carcinoma   Distance from closest margin: 5 mm (skin margin)    Regional lymph nodes: No nodes submitted or found  Treatment effect: No known presurgical therapy    Pathologic stage classification, AJCC eighth edition  Primary tumor  pT1c: Tumor >10 mm but less than 20 mm in greatest dimension  Regional lymph nodes  pNX: Regional lymph nodes cannot be assessed    Previously performed breast biomarkers [id number]; 5/23/2019*  Estrogen Receptor: (Clone SP1), Gaia Herbs  Positive 100% of cells  Intensity of Staining:   Strong    Progesterone Receptor: (Clone 1E2), [employer]  Positive 75% of cells  Intensity of Staining:  Intermediate    Ki-67 (clone 30-9)      Percentage of positive nuclei:  5%              Favorable <10%              Borderline 10-20%              Unfavorable >20%    2018 ASCO/ CAP HER2 dual PITO Group # Inform HER2 Dual PITO  [employer]bs  ( X ) Group 5: HER2 PITO NEGATIVE    HER2/CEP17 Ratio <2.0 and <4.0 HER2 signals/cell    Number of observers: 1  Number of invasive tumor cells counted: 20  Using dual probe assay:   Average number of HER2 signals per cell: 2   Average number of CEP17 signals per cell: 2   HER2/CEP17 ratio: 1 to 1    External Controls: Adequate  Internal Controls: Adequate  Standard Assay Conditions: Met  Staining Method Used:   Formalin fixation   Antigen retrieval type:  Cell Conditioning 1, mild alejandro   Time in antigen retrieval:  30 minutes   Detection system type:   DAB Ultraview kit    [id number]                                                    <Sign Out Dr. Noé Alegria M.D., F.C. A. P      [facility]/ [address] [facility]  Printed on:  6/26/2019  [address]  [place] VARELA AM, [employer]  Original print date: 06/26/2019   Lab and Collection        LOCATION: [place]       PROCEDURE: FAISAL KAY DIGITAL SCREEN BILATERAL       CLINICAL INFORMATION: Visit for screening mammogram. Tomosynthesis.       CLINICAL:     Screening   PATIENT MEDICAL HISTORY:  Medical history includes breast cancer. FAMILY HISTORY:   No relevant family history has been documented for this patient. RISK VALUES: Tyrer-Cuzick 10yr.: n/a%, Tyrer-Cuzick life:   n/a%       COMPARISON: 6/22/2020, May 23, 2019, May 9, 2019.       TECHNIQUE: Bilateral CC and MLO views of the breasts were obtained.  3D tomosynthesis was utilized.  CAD was utilized.       BREAST COMPOSITION: The tissue of the breast(s) is heterogeneously dense. This may lower the sensitivity of mammography.       FINDINGS: Mercy Copper are bilateral biopsy clips. There are vascular calcifications. There are bilateral benign axillary lymph nodes. No significant masses, calcifications, or other findings are seen in the breast(s).       There has been no significant interval change.               Impression   No mammographic evidence of malignancy. A 1 year screening mammogram is recommended.       A result letter will be sent to the patient. Ying Lucas will also receive a reminder 1 month prior to her next mammogram.       .           BI-RADS CATEGORY 2: BENIGN FINDINGS.       Management Recommendation: Routine annual mammography.               **This report has been created using voice recognition software. It may contain minor errors which are inherent in voice recognition technology. **       Final report electronically signed by Dr. Nain Philippe on 6/7/2021 12:01 PM         Imaging reviewed.

## 2021-08-11 ENCOUNTER — HOSPITAL ENCOUNTER (OUTPATIENT)
Dept: INFUSION THERAPY | Age: 76
Discharge: HOME | End: 2021-08-11
Payer: MEDICARE

## 2021-08-11 ENCOUNTER — OFFICE VISIT (OUTPATIENT)
Dept: ONCOLOGY | Age: 76
End: 2021-08-11
Payer: MEDICARE

## 2021-08-11 VITALS
HEART RATE: 90 BPM | RESPIRATION RATE: 18 BRPM | OXYGEN SATURATION: 99 % | TEMPERATURE: 97.1 F | BODY MASS INDEX: 31.58 KG/M2 | HEIGHT: 64 IN | WEIGHT: 185 LBS

## 2021-08-11 DIAGNOSIS — Z12.31 VISIT FOR SCREENING MAMMOGRAM: Primary | ICD-10-CM

## 2021-08-11 DIAGNOSIS — Z17.0 MALIGNANT NEOPLASM OF LOWER-OUTER QUADRANT OF LEFT BREAST OF FEMALE, ESTROGEN RECEPTOR POSITIVE (HCC): ICD-10-CM

## 2021-08-11 DIAGNOSIS — C50.512 MALIGNANT NEOPLASM OF LOWER-OUTER QUADRANT OF LEFT BREAST OF FEMALE, ESTROGEN RECEPTOR POSITIVE (HCC): ICD-10-CM

## 2021-08-11 LAB
ABSOLUTE IMMATURE GRANULOCYTE: 0.01 THOU/MM3 (ref 0–0.07)
ALBUMIN SERPL-MCNC: 4.1 G/DL (ref 3.5–5.1)
ALP BLD-CCNC: 88 U/L (ref 38–126)
ALT SERPL-CCNC: 15 U/L (ref 11–66)
AST SERPL-CCNC: 21 U/L (ref 5–40)
BASINOPHIL, AUTOMATED: 1 % (ref 0–3)
BASOPHILS ABSOLUTE: 0 THOU/MM3 (ref 0–0.1)
BILIRUB SERPL-MCNC: 0.6 MG/DL (ref 0.3–1.2)
BILIRUBIN DIRECT: < 0.2 MG/DL (ref 0–0.3)
BUN, WHOLE BLOOD: 11 MG/DL (ref 8–26)
CHLORIDE, WHOLE BLOOD: 107 MEQ/L (ref 98–109)
CREATININE, WHOLE BLOOD: 0.9 MG/DL (ref 0.5–1.2)
EOSINOPHILS ABSOLUTE: 0.1 THOU/MM3 (ref 0–0.4)
EOSINOPHILS RELATIVE PERCENT: 2 % (ref 0–4)
GFR, ESTIMATED ,CON: 65 ML/MIN/1.73M2
GLUCOSE, WHOLE BLOOD: 117 MG/DL (ref 70–108)
HCT VFR BLD CALC: 37.8 % (ref 37–47)
HEMOGLOBIN: 12.5 GM/DL (ref 12–16)
IMMATURE GRANULOCYTES: 0 %
IONIZED CALCIUM, WHOLE BLOOD: 1.15 MMOL/L (ref 1.12–1.32)
LYMPHOCYTES # BLD: 28 % (ref 15–47)
LYMPHOCYTES ABSOLUTE: 1.8 THOU/MM3 (ref 1–4.8)
MCH RBC QN AUTO: 33.7 PG (ref 26–33)
MCHC RBC AUTO-ENTMCNC: 33.1 GM/DL (ref 32.2–35.5)
MCV RBC AUTO: 102 FL (ref 81–99)
MONOCYTES ABSOLUTE: 0.7 THOU/MM3 (ref 0.4–1.3)
MONOCYTES: 11 % (ref 0–12)
PDW BLD-RTO: 13.3 % (ref 11.5–14.5)
PLATELET # BLD: 263 THOU/MM3 (ref 130–400)
PMV BLD AUTO: 8.9 FL (ref 9.4–12.4)
POTASSIUM, WHOLE BLOOD: 3.7 MEQ/L (ref 3.5–4.9)
RBC # BLD: 3.71 MILL/MM3 (ref 4.2–5.4)
SEG NEUTROPHILS: 59 % (ref 43–75)
SEGMENTED NEUTROPHILS ABSOLUTE COUNT: 3.9 THOU/MM3 (ref 1.8–7.7)
SODIUM, WHOLE BLOOD: 142 MEQ/L (ref 138–146)
TOTAL CO2, WHOLE BLOOD: 24 MEQ/L (ref 23–33)
TOTAL PROTEIN: 7.4 G/DL (ref 6.1–8)
WBC # BLD: 6.6 THOU/MM3 (ref 4.8–10.8)

## 2021-08-11 PROCEDURE — 99213 OFFICE O/P EST LOW 20 MIN: CPT | Performed by: INTERNAL MEDICINE

## 2021-08-11 PROCEDURE — 99211 OFF/OP EST MAY X REQ PHY/QHP: CPT

## 2021-08-11 PROCEDURE — 80047 BASIC METABLC PNL IONIZED CA: CPT

## 2021-08-11 PROCEDURE — 80076 HEPATIC FUNCTION PANEL: CPT

## 2021-08-11 PROCEDURE — G8400 PT W/DXA NO RESULTS DOC: HCPCS | Performed by: INTERNAL MEDICINE

## 2021-08-11 PROCEDURE — 85025 COMPLETE CBC W/AUTO DIFF WBC: CPT

## 2021-08-11 PROCEDURE — 1036F TOBACCO NON-USER: CPT | Performed by: INTERNAL MEDICINE

## 2021-08-11 PROCEDURE — 1090F PRES/ABSN URINE INCON ASSESS: CPT | Performed by: INTERNAL MEDICINE

## 2021-08-11 PROCEDURE — 1123F ACP DISCUSS/DSCN MKR DOCD: CPT | Performed by: INTERNAL MEDICINE

## 2021-08-11 PROCEDURE — 36415 COLL VENOUS BLD VENIPUNCTURE: CPT

## 2021-08-11 PROCEDURE — 4040F PNEUMOC VAC/ADMIN/RCVD: CPT | Performed by: INTERNAL MEDICINE

## 2021-08-11 PROCEDURE — G8417 CALC BMI ABV UP PARAM F/U: HCPCS | Performed by: INTERNAL MEDICINE

## 2021-08-11 PROCEDURE — G8427 DOCREV CUR MEDS BY ELIG CLIN: HCPCS | Performed by: INTERNAL MEDICINE

## 2021-08-11 RX ORDER — AMLODIPINE BESYLATE 2.5 MG/1
2.5 TABLET ORAL DAILY
COMMUNITY

## 2021-08-11 NOTE — PROGRESS NOTES
[facility] CANCER CENTER  CANCER NETWORK OF [facility]  [facility] [address] [facility] [address]  [address], [facility] [address]  [address]  Dept: [phone]  Dept Fax: [phone]  Loc: [phone]    Subjective:      Chief Complaint: Melvin Swann is a 67 y.o. female with breast cancer. Muna Vega was noted to have an abnormal screening mammogram in May, 2019. Abnormalities were noted on the mammogram in both breasts. One located in the lower outer quadrant of the right breast, a second being located in the upper outer quadrant of the left breast and a third being located in the lower outer quadrant of the left breast. She underwent further evaluation with ultrasound and biopsy of these areas of concern. Malignancy was found in the mass located in the lower outer quadrant of the left breast.  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 Context to this condition, the malignancy was noted to be estrogen receptor positive at 100%, progesterone receptor positive at 75%, and HER-2/steven was not amplified. Imaging of lymph nodes were unremarkable. At the time of the patient's abnormal mammogram she had no associated signs and symptoms of breast disease. She has no prior history of breast disease. Pathology from the other 2 biopsies obtained were to be benign findings. She was seen by Dr. Claude Comber and underwent surgical intervention. The patient had a lumpectomy and sentinel node evaluation completed at the time of her surgery. Definitive diagnosis confirmed an invasive ductal carcinoma involving the left breast.  All margins were clear of the malignancy. The malignancy measured 1.1 cm in size. [place] lymph node analysis was unremarkable for malignancy. In context to the pathology obtained she was also noted to have an Oncotype DX recurrence score of 19.   This would place the patient at low risk of recurrence and chemotherapy benefit would be less than 1%. Therefore, chemotherapy was not recommended to the patient. HPI: Tiffany Lindo returns today for follow-up regarding her history of breast cancer. On today's evaluation she states that she has no signs or symptoms of be suggestive recurrence of her malignancy. The patient denies skeletal pain. She did have a mammogram completed in June 2021 which was reported as a benign appearing mammogram. This was reviewed with the patient today. She has not had fever, cough, shortness of breath or other signs of infection. The patient's bowel and bladder habits have been normal.  She has not seen blood in her stool or urine. The patient remains active with an ECOG performance status of level 0. Of note, the patient does have estrogen receptor positive malignancy and did undergo a trial of Arimidex. She did not tolerate this well and elected not to proceed with any further antiestrogen therapy. PMH, SH, and FH:  I reviewed the patients medication list and allergy list as noted on the electronic medical record. The PMH, SH and FH were also reviewed as noted on the EMR. Review of Systems  Constitutional: Negative. HENT: Negative. Eyes: Negative. Respiratory: Negative. Cardiovascular: Negative. Gastrointestinal: Negative. Genitourinary: Negative. Musculoskeletal: Negative. Skin: Negative. Neurological: Negative. Hematological: Negative. Psychiatric/Behavioral: Negative. Objective:   Physical Exam  Vitals:    08/11/21 0916   Pulse: 90   Resp: 18   Temp: 97.1 °F (36.2 °C)   SpO2: 99%   Vitals reviewed and are stable. Constitutional: Well-developed. No acute distress. HENT: Normocephalic and atraumatic. Eyes: Pupils appear equal and reactive. Neck: Overall appearance is symmetrical. No identifiable masses. Pulmonary: Effort normal. No respiratory distress. Chest: Bilateral breast exam displays no palpable abnormalities.   Surgical incisions are well-healed. There is no evidence of any localized recurrence. Neurological: Alert and oriented to person, place, and time. Judgment and thought content normal.  Skin: Skin is warm and dry. No rash. Psychiatric: Mood and affect appropriate for the clinical situation. Data Analysis: The following laboratory studies were reviewed with the patient today:    Hematology 8/11/2021 8/12/2020 6/3/2019   WBC 6.6 6.0 6.2   RBC 3.71 (L) 3.77 (L) 3.80 (L)   HGB 12.5 12.6 13.0   HCT 37.8 38.2 39.2    (H) 101 (H) 103.2 (H)   RDW 13.3 13.2     238 249     Assessment:   1. Invasive ductal carcinoma of the left breast, stage I, estrogen receptor positive, progesterone receptor, and HER-2/steven overexpression negative. Plan:   1. Monitor for recurrence of malignancy. 2.  Continue annual mammogram.    Girma Casiano M.D. Medical Director: [facility]  Cancer Network [address] [facility], [address] [place] [address], [address] [place] [address] of the [facility] at [facility]      **This report has been created using voice recognition software. It may contain minor errors which are inherent in voice recognition technology. **

## 2022-03-17 ENCOUNTER — OFFICE VISIT (OUTPATIENT)
Dept: SURGERY | Age: 77
End: 2022-03-17
Payer: MEDICARE

## 2022-03-17 VITALS
TEMPERATURE: 97 F | BODY MASS INDEX: 31.58 KG/M2 | RESPIRATION RATE: 14 BRPM | HEART RATE: 72 BPM | WEIGHT: 185 LBS | SYSTOLIC BLOOD PRESSURE: 138 MMHG | DIASTOLIC BLOOD PRESSURE: 77 MMHG | HEIGHT: 64 IN | OXYGEN SATURATION: 98 %

## 2022-03-17 DIAGNOSIS — Z17.0 BREAST CANCER, STAGE 1, ESTROGEN RECEPTOR POSITIVE, LEFT (HCC): Primary | ICD-10-CM

## 2022-03-17 DIAGNOSIS — I10 ESSENTIAL HYPERTENSION: ICD-10-CM

## 2022-03-17 DIAGNOSIS — C50.912 BREAST CANCER, STAGE 1, ESTROGEN RECEPTOR POSITIVE, LEFT (HCC): Primary | ICD-10-CM

## 2022-03-17 PROCEDURE — 4040F PNEUMOC VAC/ADMIN/RCVD: CPT | Performed by: SURGERY

## 2022-03-17 PROCEDURE — G8427 DOCREV CUR MEDS BY ELIG CLIN: HCPCS | Performed by: SURGERY

## 2022-03-17 PROCEDURE — 1123F ACP DISCUSS/DSCN MKR DOCD: CPT | Performed by: SURGERY

## 2022-03-17 PROCEDURE — 1036F TOBACCO NON-USER: CPT | Performed by: SURGERY

## 2022-03-17 PROCEDURE — 1090F PRES/ABSN URINE INCON ASSESS: CPT | Performed by: SURGERY

## 2022-03-17 PROCEDURE — G8400 PT W/DXA NO RESULTS DOC: HCPCS | Performed by: SURGERY

## 2022-03-17 PROCEDURE — G8484 FLU IMMUNIZE NO ADMIN: HCPCS | Performed by: SURGERY

## 2022-03-17 PROCEDURE — G8417 CALC BMI ABV UP PARAM F/U: HCPCS | Performed by: SURGERY

## 2022-03-17 PROCEDURE — 99213 OFFICE O/P EST LOW 20 MIN: CPT | Performed by: SURGERY

## 2022-03-17 ASSESSMENT — ENCOUNTER SYMPTOMS
NAUSEA: 0
COLOR CHANGE: 0
WHEEZING: 0
COUGH: 0
BACK PAIN: 0
SHORTNESS OF BREATH: 0
VOMITING: 0
SORE THROAT: 0
SINUS PRESSURE: 0
ABDOMINAL PAIN: 0
BLOOD IN STOOL: 0

## 2022-03-17 NOTE — PROGRESS NOTES
Darnell Gramajo MD   General Surgery  Follow up Patient Evaluation in Office  Pt Name: Valerio Morgan  Date of Birth 1945   Today's Date: 3/17/2022  Medical Record Number: [id number]  Referring Provider: No ref. provider found  Primary Care Provider: Tre Crow MD  Chief Complaint:  Chief Complaint   Patient presents with    6 Month Follow-Up     History of left breast cancer. Breast cancer, stage 1, estrogen receptor positive, left. ASSESSMENT      1. Breast cancer, stage 1, estrogen receptor positive, left ([address]    2. Essential hypertension    3. Benign-appearing cyst left wrist.  Nonpainful. Referral to hand surgeon if patient desires. She states she will watch it is currently asymptomatic. 4.  Left chest wall pain likely secondary to recent fall  PLANS      1. Clinical breast examination performed, benign. 2.  Screening mammography reviewed June 2021. No mammographic evidence of malignancy. 3.  Breast cancer survivorship plan reviewed  4. Encourage self breast examinations monthly. Call for any changes. 5.  Follow-up surgical clinic 3 months as previously scheduled. Patient wanted short interval visit. Follow-up mammography June 2022  6. Continue follow-up with medical oncology as well      SUBJECTIVE   History of present illness:  Maryan Roman is a 67y.o. year old female who is presenting today in the office for follow-up of left breast cancer. She had an abnormal screening mammogram in May 2019. Abnormalities were seen in both breasts. She underwent diagnostic imaging, ultrasound and biopsies of the areas of concern. She had a invasive ductal carcinoma found in the lower outer quadrant of the left breast it was nuclear grade 2. Was ER +100% ID +75% and HER-2/steven was not amplified. Ultrasound imaging of the axilla revealed no suspicious findings. Biopsies of additional areas were benign. Maryan Roman underwent a left lumpectomy and sentinel lymph node biopsy.   Final pathology margins were negative. Invasive cancer was 1.1 cm in size. Closest margin was 5 mm to the skin. Lymph nodes were negative for malignancy. Oncotype DX recurrence score was 19. Patient opted out of radiation therapy. She was started on anastrozole but 3 days after starting she had issues with blood pressure and went off the aromatase inhibitor. She never resumed. Interval history: Megan Velazquez is doing well. Clinical breast examination today is benign. She denies any new cancer diagnoses in her family. She denies any signs or symptoms of lymphedema. She had a fall her dog pulled her off her deck she has some left lower rib pain there is no ecchymoses. Bony tenderness over chest wall. Clinical breast examination is benign. She had no imaging. I do not really feel indicated at this time. She also had me look at a cyst on the dorsal aspect of her wrist.  As it may be attached to a tendon if she wanted it removed would recommend hand surgery. She states is asymptomatic and she will watch at this time. .. She had her children between the ages of 20 and 27. She took oral contraceptive pills briefly for about 3 years. No family history of breast or ovarian malignancy. Brother history of colon cancer. She denies prior breast biopsies.     Past Medical History  Past Medical History:   Diagnosis Date    Breast cancer ([address]     left breat     Diverticulitis     GERD (gastroesophageal reflux disease)     Hypertension     IBS (irritable bowel syndrome)     Invasive ductal carcinoma of breast ([address]     left    Macular degeneration        Past Surgical History  Past Surgical History:   Procedure Laterality Date    BREAST BIOPSY Left 2019    [address] [place] [address]-    BREAST BIOPSY Right     stereotatic    BREAST LUMPECTOMY Left 2019    LEFT BREAST LUMPECTOMY WITH PREOP NEEDLE LOC performed by Brendan Gudino [facility] at [facility]  10/19/2007    [address] [place] [address] Ryan Hernandez in [address] [place] [address] Blvd  2017    Dr Maverick Shields  11/2021    Dr. Joshua Gasca Bilateral [address] [facility]       Medications  Current Outpatient Medications   Medication Sig Dispense Refill    psyllium (KONSYL) 28.3 % PACK Take 1 packet by mouth daily      amLODIPine (NORVASC) 2.5 MG tablet Take 2.5 mg by mouth daily      Cholecalciferol (VITAMIN D3) 1.25 MG (12296 UT) TABS Take by mouth Every 3 weeks      levocetirizine (XYZAL) 5 MG tablet Take 5 mg by mouth nightly      chlordiazePOXIDE-clidinium (LIBRAX) 5-2.5 MG per capsule Take 1 capsule by mouth daily as needed.  amLODIPine (NORVASC) 5 MG tablet Take 7.5 mg by mouth daily       azelastine (ASTELIN) 0.1 % nasal spray 1 spray by Nasal route 2 times daily Use in each nostril as directed      famotidine (PEPCID) 20 MG tablet Take 20 mg by mouth 2 times daily      metoprolol succinate (TOPROL XL) 50 MG extended release tablet Take 50 mg by mouth daily      Multiple Vitamins-Minerals (THERAPEUTIC MULTIVITAMIN-MINERALS) tablet Take 1 tablet by mouth daily       No current facility-administered medications for this visit.      Allergies     Allergies   Allergen Reactions    Aspirin Hives    Ceftin [Cefuroxime Axetil] Rash    Cefuroxime Rash    Codeine Rash    Erythromycin Rash    Nexium [Esomeprazole] Diarrhea    Penicillins Rash    Cephalosporins Rash    Milk Protein Nausea Only    Reglan [Metoclopramide] Diarrhea    Seasonal Other (See Comments)    Shellfish-Derived Products Nausea And Vomiting       Family History  Family History   Problem Relation Age of Onset    Colon Cancer Brother     Lung Cancer Brother     Depression Mother     Stroke Father     Heart Disease Sister     No Known Problems Maternal Grandmother     No Known Problems Maternal Grandfather     No Known Problems Paternal Grandmother     No Known Problems Paternal Grandfather     Breast Cancer Neg Hx     Cancer Neg Hx        SocialHistory  Social History     Socioeconomic History    Marital status:      Spouse name: Not on file    Number of children: 10    Years of education: Not on file    Highest education level: Not on file   Occupational History    Occupation: retired   Tobacco Use    Smoking status: Never Smoker    Smokeless tobacco: Never Used   Vaping Use    Vaping Use: Never used   Substance and Sexual Activity    Alcohol use: Yes     Comment: glass wine nightly    Drug use: Never    Sexual activity: Yes     Partners: Male   Other Topics Concern    Not on file   Social History Narrative    Not on file     Social Determinants of Health     Financial Resource Strain:     Difficulty of Paying Living Expenses: Not on file   Food Insecurity:     Worried About [address] in the Last Year: Not on file    Maria Elena of Food in the Last Year: Not on file   Transportation Needs:     Lack of Transportation (Medical): Not on file    Lack of Transportation (Non-Medical):  Not on file   Physical Activity:     Days of Exercise per Week: Not on file    Minutes of Exercise per Session: Not on file   Stress:     Feeling of Stress : Not on file   Social Connections:     Frequency of Communication with Friends and Family: Not on file    Frequency of Social Gatherings with Friends and Family: Not on file    Attends Latter day Services: Not on file    Active Member of Clubs or Organizations: Not on file    Attends Club or Organization Meetings: Not on file    Marital Status: Not on file   Intimate Partner Violence:     Fear of Current or Ex-Partner: Not on file    Emotionally Abused: Not on file    Physically Abused: Not on file    Sexually Abused: Not on file   Housing Stability:     Unable to Pay for Housing in the Last Year: Not on file  Number of Places Lived in the Last Year: Not on file    Unstable Housing in the Last Year: Not on file           Review of Systems  Review of Systems   Constitutional: Negative for chills, fatigue, fever and unexpected weight change. HENT: Negative for congestion, sinus pressure and sore throat. Reports congestion due to seasonal allergies. Eyes: Negative for pain and redness. Respiratory: Negative for cough, shortness of breath and wheezing. Cardiovascular: Negative for chest pain and palpitations. Left chest wall pain inferior ribs   Gastrointestinal: Negative for abdominal pain, blood in stool, nausea and vomiting. Endocrine: Negative for cold intolerance, heat intolerance and polydipsia. Genitourinary: Negative for dysuria, flank pain and hematuria. Musculoskeletal: Negative for back pain, gait problem, joint swelling and myalgias. Nodule left wrist   Skin: Negative for color change and rash. Nodule left wrist   Allergic/Immunologic: Negative for environmental allergies and immunocompromised state. Neurological: Negative for dizziness, tremors, seizures, speech difficulty and headaches. Hematological: Negative for adenopathy. Does not bruise/bleed easily. Psychiatric/Behavioral: Negative for behavioral problems and confusion. OBJECTIVE     /77 (Site: Right Upper Arm, Position: Sitting, Cuff Size: Medium Adult)   Pulse 72   Temp 97 °F (36.1 °C) (Tympanic)   Resp 14   Ht 5' 4\" (1.626 m)   Wt 185 lb (83.9 kg)   SpO2 98%   BMI 31.76 kg/m²      Physical Exam  Vitals reviewed. Constitutional:       General: She is not in acute distress. Appearance: Normal appearance. She is well-developed. She is not ill-appearing or diaphoretic. HENT:      Head: Normocephalic and atraumatic. Nose: No congestion. Mouth/Throat:      Pharynx: No posterior oropharyngeal erythema. Eyes:      General: No scleral icterus.      Extraocular Movements: Extraocular movements intact. Pupils: Pupils are equal, round, and reactive to light. Neck:      Vascular: No JVD. Trachea: No tracheal deviation. Cardiovascular:      Rate and Rhythm: Normal rate and regular rhythm. Heart sounds: Normal heart sounds. No murmur heard. Pulmonary:      Effort: Pulmonary effort is normal. No respiratory distress. Breath sounds: Normal breath sounds. No wheezing. Chest:      Chest wall: No tenderness. Breasts:      Right: No swelling, bleeding, inverted nipple, mass, nipple discharge, skin change, tenderness, axillary adenopathy or supraclavicular adenopathy. Left: No swelling, bleeding, inverted nipple, mass, nipple discharge, skin change, tenderness, axillary adenopathy or supraclavicular adenopathy. Abdominal:      General: There is no distension. Palpations: Abdomen is soft. There is no mass. Tenderness: There is no abdominal tenderness. Musculoskeletal:         General: No deformity. Cervical back: Neck supple. No muscular tenderness. Comments: 1 cm soft nodule dorsal surface left wrist   Lymphadenopathy:      Cervical: No cervical adenopathy. Right cervical: No superficial, deep or posterior cervical adenopathy. Left cervical: No superficial, deep or posterior cervical adenopathy. Upper Body:      Right upper body: No supraclavicular, axillary or pectoral adenopathy. Left upper body: No supraclavicular, axillary or pectoral adenopathy. Skin:     General: Skin is warm and dry. Coloration: Skin is not jaundiced or pale. Findings: No rash. Neurological:      Mental Status: She is alert and oriented to person, place, and time. Cranial Nerves: No cranial nerve deficit. Psychiatric:         Behavior: Behavior normal.         Thought Content:  Thought content normal.         Lab Results   Component Value Date    WBC 6.6 08/11/2021    HGB 12.5 08/11/2021    HCT 37.8 08/11/2021    PLT 263 2021    ALT 15 2021    AST 21 2021     2021    K 3.7 2021     2019    CREATININE 0.9 2021    BUN 15 2020    CO2 23 2020      Performed by: [facility]     [place]               [id number]  Assoc.                                              Page 1 of 1  [address] [place], [place] [address]                                                      PROC: 2019  [facility]                                    RECV: 2019  [address]                                    RPTD: 2019  [address] [place], [place] [address]                      MRN:  [id number]    LOC: ASO                      ACCT: [de-identified]  SEX: F                      : 1945  AGE: 75 Y                         PATHOLOGY REPORT                      ATTN: TARA EASTON  [de-identified]                  REQ: TARA EASTON        Clinical Information: INVASIVE DUCTAL CARCINOMA LEFT BREAST    ADDENDUM REPORT 19, 19:    FINAL DIAGNOSIS:  Left breast, excision:   Invasive ductal carcinoma, [place] grade 2 (pT1c).  Margins are negative.    Distance to closest margin: 5 mm (skin margin). COMMENT 19:  The slides and report were reviewed by Dr. Dodson Najjar and  archival tissue (block A4) was selected for the following testing:  Oncotype DX (ordered by Dr. Aram Oglesby).   Please see the separately issued  reference report for the results to these test(s). ADDENDUM REPORT 19:  Oncotype DX Breast Cancer Assay (RT-PCR) performed by [employer]  Breast Cancer Recurrence Score:   19    Please see full reference report under Media tab, Pathology, in [address]. Specimen:  EXCISION OF BREAST, LEFT      Gross Examination:  The container is labeled Gutierrez Eng, left breast tissue.   Received fresh on an x-ray grid is an oriented lumpectomy specimen  including a localization wire.  The specimen measures 4 cm from medial  to lateral margins, 4 cm from the cranial to caudal margins and about  2.4 cm from the skin to deep margin.  The accompanying x-ray indicates  a clip at grid coordinate C3.  The deep margin is inked blue.  The skin  margin is inked yellow.  The cranial and medial margins are inked  black; and the caudal and lateral margins are inked green.  There is a  firm, white, moderately circumscribed lesion grossly measuring about  1.2 x 0.9 x 1.5 cm.  The lesion is focally 0.3 cm from the skin margin.   The biopsy site is 0.2 cm from the deep margin.  Representative  sections are submitted:  Cassette #1 - medial margin; cassette #2 -  cranial, skin and deep margin; cassette #3 - caudal, skin and deep  margin; cassette #4 - skin and deep margin with mass; cassette #5 -  cranial margin; cassette #6 - caudal margin; cassette #7 - cranial,  skin and deep margin; cassette #8 - caudal, skin and deep margin;  cassette #9 - lateral margin.  ss.  MTK/DKR:[id number]    Fixative:  10% neutral buffered formalin  Tissue removal time:  09:34  Tissue fixation time:  10:05  Total fixation time: 9 hours 55 minutes    Microscopic Examination:  Invasive carcinoma the breast: Resection  Procedure: Excision  Specimen laterality: Left  Tumor site: Not specified  Tumor size: Greatest dimension of largest invasive focus: 11 mm  Histologic type:  Invasive ductal carcinoma, not otherwise specified  Histologic grade-[place] histologic score  Glandular/tubular differentiation: Score 3  Nuclear pleomorphism: Score 2  Mitotic rate: Score 1  Overall grade: Grade 2    Ductal carcinoma in situ: Not identified  Margins  Invasive carcinoma margins  Uninvolved by invasive carcinoma   Distance from closest margin: 5 mm (skin margin)    Regional lymph nodes: No nodes submitted or found  Treatment effect: No known presurgical therapy    Pathologic stage classification, AJCC eighth edition  Primary tumor  pT1c: Tumor >10 mm but less than 20 mm in greatest dimension  Regional lymph nodes  pNX: Regional lymph nodes cannot be assessed    Previously performed breast biomarkers [id number]; 5/23/2019*  Estrogen Receptor: (Clone SP1), [employer]  Positive 100% of cells  Intensity of Staining:   Strong    Progesterone Receptor: (Clone 1E2), [employer]  Positive 75% of cells  Intensity of Staining:  Intermediate    Ki-67 (clone 30-9)      Percentage of positive nuclei:  5%              Favorable <10%              Borderline 10-20%              Unfavorable >20%    2018 ASCO/ CAP HER2 dual PITO Group # Inform HER2 Dual PITO  [employer]  ( X ) Group 5: HER2 PITO NEGATIVE    HER2/CEP17 Ratio <2.0 and <4.0 HER2 signals/cell    Number of observers: 1  Number of invasive tumor cells counted: 20  Using dual probe assay:   Average number of HER2 signals per cell: 2   Average number of CEP17 signals per cell: 2   HER2/CEP17 ratio: 1 to 1    External Controls: Adequate  Internal Controls: Adequate  Standard Assay Conditions: Met  Staining Method Used:   Formalin fixation   Antigen retrieval type:  Cell Conditioning 1, mild alejandro   Time in antigen retrieval:  30 minutes   Detection system type:   DAB Ultraview kit    [id number]  [id number]                                                    <Sign Out Dr. Ernie Poole M.D., F.C. A. P      [facility]/ [facility]  Printed on:  6/26/2019  [address]  [facility] AURELIANO MOORE .[place], [facility] [employer] [facility]  Original print date: 06/26/2019   Lab and Collection        LOCATION: [place]       PROCEDURE: FAISAL KAY DIGITAL SCREEN BILATERAL       CLINICAL INFORMATION: Visit for screening mammogram. Tomosynthesis.       CLINICAL:     Screening   PATIENT MEDICAL HISTORY:  Medical history includes breast cancer. FAMILY HISTORY:   No relevant family history has been documented for this patient.    RISK VALUES: Lonnie-Cherise 10yr.: n/a%, Lonnie-Cherise life:   n/a%       COMPARISON: 6/22/2020, May 23, 2019, May 9, 2019.       TECHNIQUE:

## 2022-03-18 ASSESSMENT — ENCOUNTER SYMPTOMS
EYE PAIN: 0
EYE REDNESS: 0

## 2022-06-30 ENCOUNTER — HOSPITAL ENCOUNTER (OUTPATIENT)
Dept: WOMENS IMAGING | Age: 77
Discharge: HOME | End: 2022-06-30
Payer: MEDICARE

## 2022-06-30 DIAGNOSIS — Z12.31 VISIT FOR SCREENING MAMMOGRAM: ICD-10-CM

## 2022-06-30 PROCEDURE — 77063 BREAST TOMOSYNTHESIS BI: CPT

## 2022-08-01 ENCOUNTER — OFFICE VISIT (OUTPATIENT)
Dept: ONCOLOGY | Age: 77
End: 2022-08-01
Payer: MEDICARE

## 2022-08-01 ENCOUNTER — HOSPITAL ENCOUNTER (OUTPATIENT)
Dept: INFUSION THERAPY | Age: 77
Discharge: HOME | End: 2022-08-01
Payer: MEDICARE

## 2022-08-01 DIAGNOSIS — Z17.0 MALIGNANT NEOPLASM OF LOWER-OUTER QUADRANT OF LEFT BREAST OF FEMALE, ESTROGEN RECEPTOR POSITIVE (HCC): ICD-10-CM

## 2022-08-01 DIAGNOSIS — Z17.0 MALIGNANT NEOPLASM OF LOWER-OUTER QUADRANT OF LEFT BREAST OF FEMALE, ESTROGEN RECEPTOR POSITIVE (HCC): Primary | ICD-10-CM

## 2022-08-01 DIAGNOSIS — Z12.31 ENCOUNTER FOR SCREENING MAMMOGRAM FOR MALIGNANT NEOPLASM OF BREAST: ICD-10-CM

## 2022-08-01 DIAGNOSIS — C50.512 MALIGNANT NEOPLASM OF LOWER-OUTER QUADRANT OF LEFT BREAST OF FEMALE, ESTROGEN RECEPTOR POSITIVE (HCC): Primary | ICD-10-CM

## 2022-08-01 DIAGNOSIS — C50.512 MALIGNANT NEOPLASM OF LOWER-OUTER QUADRANT OF LEFT BREAST OF FEMALE, ESTROGEN RECEPTOR POSITIVE (HCC): ICD-10-CM

## 2022-08-01 LAB
ABSOLUTE IMMATURE GRANULOCYTE: 0.01 THOU/MM3 (ref 0–0.07)
ALBUMIN SERPL-MCNC: 4 G/DL (ref 3.5–5.1)
ALP BLD-CCNC: 83 U/L (ref 38–126)
ALT SERPL-CCNC: 15 U/L (ref 11–66)
AST SERPL-CCNC: 21 U/L (ref 5–40)
BASINOPHIL, AUTOMATED: 0 % (ref 0–3)
BASOPHILS ABSOLUTE: 0 THOU/MM3 (ref 0–0.1)
BILIRUB SERPL-MCNC: 0.8 MG/DL (ref 0.3–1.2)
BILIRUBIN DIRECT: < 0.2 MG/DL (ref 0–0.3)
BUN, WHOLE BLOOD: 11 MG/DL (ref 8–26)
CHLORIDE, WHOLE BLOOD: 107 MEQ/L (ref 98–109)
CREATININE, WHOLE BLOOD: 0.8 MG/DL (ref 0.5–1.2)
EOSINOPHILS ABSOLUTE: 0.2 THOU/MM3 (ref 0–0.4)
EOSINOPHILS RELATIVE PERCENT: 3 % (ref 0–4)
GFR, ESTIMATED ,CON: 74 ML/MIN/1.73M2
GLUCOSE, WHOLE BLOOD: 100 MG/DL (ref 70–108)
HCT VFR BLD CALC: 38.2 % (ref 37–47)
HEMOGLOBIN: 12.3 GM/DL (ref 12–16)
IMMATURE GRANULOCYTES: 0 %
IONIZED CALCIUM, WHOLE BLOOD: 1.14 MMOL/L (ref 1.12–1.32)
LYMPHOCYTES # BLD: 34 % (ref 15–47)
LYMPHOCYTES ABSOLUTE: 2.1 THOU/MM3 (ref 1–4.8)
MCH RBC QN AUTO: 33.4 PG (ref 26–33)
MCHC RBC AUTO-ENTMCNC: 32.2 GM/DL (ref 32.2–35.5)
MCV RBC AUTO: 104 FL (ref 81–99)
MONOCYTES ABSOLUTE: 0.7 THOU/MM3 (ref 0.4–1.3)
MONOCYTES: 11 % (ref 0–12)
PDW BLD-RTO: 13.3 % (ref 11.5–14.5)
PLATELET # BLD: 241 THOU/MM3 (ref 130–400)
PMV BLD AUTO: 9 FL (ref 9.4–12.4)
POTASSIUM, WHOLE BLOOD: 3.7 MEQ/L (ref 3.5–4.9)
RBC # BLD: 3.68 MILL/MM3 (ref 4.2–5.4)
SEG NEUTROPHILS: 51 % (ref 43–75)
SEGMENTED NEUTROPHILS ABSOLUTE COUNT: 3.2 THOU/MM3 (ref 1.8–7.7)
SODIUM, WHOLE BLOOD: 143 MEQ/L (ref 138–146)
TOTAL CO2, WHOLE BLOOD: 25 MEQ/L (ref 23–33)
TOTAL PROTEIN: 7.4 G/DL (ref 6.1–8)
WBC # BLD: 6.2 THOU/MM3 (ref 4.8–10.8)

## 2022-08-01 PROCEDURE — 99211 OFF/OP EST MAY X REQ PHY/QHP: CPT

## 2022-08-01 PROCEDURE — 80047 BASIC METABLC PNL IONIZED CA: CPT

## 2022-08-01 PROCEDURE — 85025 COMPLETE CBC W/AUTO DIFF WBC: CPT

## 2022-08-01 PROCEDURE — 80076 HEPATIC FUNCTION PANEL: CPT

## 2022-08-01 PROCEDURE — 1123F ACP DISCUSS/DSCN MKR DOCD: CPT | Performed by: INTERNAL MEDICINE

## 2022-08-01 PROCEDURE — 99213 OFFICE O/P EST LOW 20 MIN: CPT | Performed by: INTERNAL MEDICINE

## 2022-08-01 RX ORDER — PANTOPRAZOLE SODIUM 20 MG/1
TABLET, DELAYED RELEASE ORAL
COMMUNITY
Start: 2022-07-14

## 2022-08-01 NOTE — PROGRESS NOTES
[facility]  C[facility]ER NETWORK OF [facility]  [facility] [address] [place] [facility]  [address] [facility] [address] [facility] [address]  Dept: [phone]  Dept Fax: [phone]  Loc: [phone]    Subjective:      Chief Complaint: Kaity Villalobos is a 67 y.o. female with breast cancer. Shasta Davidson was noted to have an abnormal screening mammogram in May, 2019. Abnormalities were noted on the mammogram in both breasts. One located in the lower outer quadrant of the right breast, a second being located in the upper outer quadrant of the left breast and a third being located in the lower outer quadrant of the left breast. She underwent further evaluation with ultrasound and biopsy of these areas of concern. Malignancy was found in the mass located in the lower outer quadrant of the left breast.  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 Context to this condition, the malignancy was noted to be estrogen receptor positive at 100%, progesterone receptor positive at 75%, and HER-2/steven was not amplified. Imaging of lymph nodes were unremarkable. At the time of the patient's abnormal mammogram she had no associated signs and symptoms of breast disease. She has no prior history of breast disease. Pathology from the other 2 biopsies obtained were to be benign findings. She was seen by Dr. Joselin Boland and underwent surgical intervention. The patient had a lumpectomy and sentinel node evaluation completed at the time of her surgery. Definitive diagnosis confirmed an invasive ductal carcinoma involving the left breast.  All margins were clear of the malignancy. The malignancy measured 1.1 cm in size. [place] lymph node analysis was unremarkable for malignancy. In context to the pathology obtained she was also noted to have an Oncotype DX recurrence score of 19.   This would place the patient at low risk of recurrence and chemotherapy benefit would be less than 1%. Therefore, chemotherapy was not recommended to the patient. HPI: The patient is here today for follow examination. She is here for follow up of her history of breast cancer. Her overall health has been good. She has no signs or symptoms that are suggestive of recurrence of her breast cancer. The patient denies skeletal pain. She has not had fever or other signs of infection. The patient denies shortness of breath, chest pain, a change in bowel habits or a change in bladder habits. ECOG performance status is level 0. Her most recent mammogram was on 06/30/2022. This was reported as a benign appearing mammogram.  The results of the mammogram reviewed with the patient today. PMH, SH, and FH:  I reviewed the patients medication list and allergy list as noted on the electronic medical record. The PMH, SH and FH were also reviewed as noted on the EMR. Review of Systems  Constitutional: Negative. HENT: Negative. Eyes: Negative. Respiratory: Negative. Cardiovascular: Negative. Gastrointestinal: Negative. Genitourinary: Negative. Musculoskeletal: Negative. Skin: Negative. Neurological: Negative. Hematological: Negative. Psychiatric/Behavioral: Negative. Objective:   Physical Exam  Vitals reviewed and are stable. Constitutional: Well-developed. No acute distress. HENT: Normocephalic and atraumatic. Eyes: Pupils appear equal and reactive. Neck: Overall appearance is symmetrical. No identifiable masses. Pulmonary: Effort normal. No respiratory distress. Chest:  Bilateral breast exam displays no palpable abnormalities. Surgical incisions are well-healed. There is no evidence of any localized recurrence. Neurological: Alert and oriented to person, place, and time. Judgment and thought content normal.  Skin: Skin is warm and dry. No rash. Psychiatric: Mood and affect appropriate for the clinical situation. Data Analysis:   The

## 2023-03-16 ENCOUNTER — TELEPHONE (OUTPATIENT)
Dept: SURGERY | Age: 78
End: 2023-03-16

## 2023-03-16 NOTE — TELEPHONE ENCOUNTER
Left voicemail letting patient know her appt on 3/23 needs rescheduled as Dr. Dev Lehman will be out of the office

## 2023-03-30 ENCOUNTER — OFFICE VISIT (OUTPATIENT)
Dept: SURGERY | Age: 78
End: 2023-03-30

## 2023-03-30 VITALS
BODY MASS INDEX: 34.23 KG/M2 | RESPIRATION RATE: 16 BRPM | OXYGEN SATURATION: 99 % | TEMPERATURE: 97.4 F | HEART RATE: 74 BPM | HEIGHT: 62 IN | WEIGHT: 186 LBS | SYSTOLIC BLOOD PRESSURE: 140 MMHG | DIASTOLIC BLOOD PRESSURE: 90 MMHG

## 2023-03-30 DIAGNOSIS — I10 ESSENTIAL HYPERTENSION: ICD-10-CM

## 2023-03-30 DIAGNOSIS — Z12.31 ENCOUNTER FOR SCREENING MAMMOGRAM FOR BREAST CANCER: Primary | ICD-10-CM

## 2023-03-30 DIAGNOSIS — Z85.3 HISTORY OF LEFT BREAST CANCER: ICD-10-CM

## 2023-03-30 ASSESSMENT — ENCOUNTER SYMPTOMS
BLOOD IN STOOL: 0
WHEEZING: 0
SHORTNESS OF BREATH: 0
EYE REDNESS: 0
COLOR CHANGE: 0
BACK PAIN: 0
VOMITING: 0
COUGH: 0
ABDOMINAL PAIN: 0
SORE THROAT: 0
EYE PAIN: 0
SINUS PRESSURE: 0
NAUSEA: 0

## 2023-03-30 NOTE — PROGRESS NOTES
UTERUS  1975    MA BIOPSY OF BREAST, INCISIONAL Right 1984    excisional biopsy benign    US BREAST BIOPSY NEEDLE ADDITIONAL LEFT Left 05/23/2019    US BREAST BIOPSY W LOC DEVICE 1ST LESION LEFT Left 05/23/2019    [address]- Invasive ductal carcinoma    US BREAST BIOPSY W LOC DEVICE 1ST LESION RIGHT Right 05/23/2019    Benign    VARICOSE VEIN SURGERY Bilateral 1986    [facility]       Medications  Current Outpatient Medications   Medication Sig Dispense Refill    pantoprazole (PROTONIX) 20 MG tablet TAKE 1 TABLET BY MOUTH ONCE DAILY      psyllium (KONSYL) 28.3 % PACK Take 1 packet by mouth daily      amLODIPine (NORVASC) 2.5 MG tablet Take 2.5 mg by mouth daily      Cholecalciferol (VITAMIN D3) 1.25 MG (25516 UT) TABS Take by mouth Every 3 weeks      levocetirizine (XYZAL) 5 MG tablet Take 5 mg by mouth nightly      amLODIPine (NORVASC) 5 MG tablet Take 7.5 mg by mouth daily       azelastine (ASTELIN) 0.1 % nasal spray 1 spray by Nasal route 2 times daily Use in each nostril as directed      metoprolol succinate (TOPROL XL) 50 MG extended release tablet Take 50 mg by mouth daily      Multiple Vitamins-Minerals (THERAPEUTIC MULTIVITAMIN-MINERALS) tablet Take 1 tablet by mouth daily      chlordiazePOXIDE-clidinium (LIBRAX) 5-2.5 MG per capsule Take 1 capsule by mouth daily as needed. (Patient not taking: Reported on 8/1/2022)      famotidine (PEPCID) 20 MG tablet Take 20 mg by mouth 2 times daily (Patient not taking: Reported on 8/1/2022)       No current facility-administered medications for this visit.      Allergies     Allergies   Allergen Reactions    Aspirin Hives    Ceftin [Cefuroxime Axetil] Rash    Cefuroxime Rash    Codeine Rash    Erythromycin Rash    Nexium [Esomeprazole] Diarrhea    Penicillins Rash    Cephalosporins Rash    Milk Protein Nausea Only    Reglan [Metoclopramide] Diarrhea    Seasonal Other (See Comments)    Shellfish-Derived Products Nausea And Vomiting       Family History  Family

## 2023-03-31 ASSESSMENT — ENCOUNTER SYMPTOMS: DIARRHEA: 1

## 2023-07-07 ENCOUNTER — HOSPITAL ENCOUNTER (OUTPATIENT)
Dept: WOMENS IMAGING | Age: 78
Discharge: HOME | End: 2023-07-07
Attending: SURGERY
Payer: MEDICARE

## 2023-07-07 DIAGNOSIS — Z12.31 ENCOUNTER FOR SCREENING MAMMOGRAM FOR BREAST CANCER: ICD-10-CM

## 2023-07-07 PROCEDURE — 77063 BREAST TOMOSYNTHESIS BI: CPT

## 2023-08-01 ENCOUNTER — HOSPITAL ENCOUNTER (OUTPATIENT)
Dept: INFUSION THERAPY | Age: 78
Discharge: HOME | End: 2023-08-01
Payer: MEDICARE

## 2023-08-01 ENCOUNTER — OFFICE VISIT (OUTPATIENT)
Dept: ONCOLOGY | Age: 78
End: 2023-08-01
Payer: MEDICARE

## 2023-08-01 VITALS
SYSTOLIC BLOOD PRESSURE: 141 MMHG | DIASTOLIC BLOOD PRESSURE: 73 MMHG | TEMPERATURE: 97.4 F | WEIGHT: 184.6 LBS | BODY MASS INDEX: 33.97 KG/M2 | RESPIRATION RATE: 20 BRPM | OXYGEN SATURATION: 94 % | HEIGHT: 62 IN | HEART RATE: 87 BPM

## 2023-08-01 VITALS
WEIGHT: 184.6 LBS | DIASTOLIC BLOOD PRESSURE: 73 MMHG | SYSTOLIC BLOOD PRESSURE: 141 MMHG | OXYGEN SATURATION: 94 % | BODY MASS INDEX: 33.97 KG/M2 | HEIGHT: 62 IN | HEART RATE: 87 BPM | RESPIRATION RATE: 20 BRPM | TEMPERATURE: 97.4 F

## 2023-08-01 DIAGNOSIS — Z17.0 MALIGNANT NEOPLASM OF LOWER-OUTER QUADRANT OF LEFT BREAST OF FEMALE, ESTROGEN RECEPTOR POSITIVE (HCC): Primary | ICD-10-CM

## 2023-08-01 DIAGNOSIS — C50.512 MALIGNANT NEOPLASM OF LOWER-OUTER QUADRANT OF LEFT BREAST OF FEMALE, ESTROGEN RECEPTOR POSITIVE (HCC): Primary | ICD-10-CM

## 2023-08-01 DIAGNOSIS — D64.9 CHRONIC ANEMIA: ICD-10-CM

## 2023-08-01 DIAGNOSIS — C50.512 MALIGNANT NEOPLASM OF LOWER-OUTER QUADRANT OF LEFT BREAST OF FEMALE, ESTROGEN RECEPTOR POSITIVE (HCC): ICD-10-CM

## 2023-08-01 DIAGNOSIS — Z17.0 MALIGNANT NEOPLASM OF LOWER-OUTER QUADRANT OF LEFT BREAST OF FEMALE, ESTROGEN RECEPTOR POSITIVE (HCC): ICD-10-CM

## 2023-08-01 LAB
ALBUMIN SERPL BCG-MCNC: 3.6 G/DL (ref 3.5–5.1)
ALP SERPL-CCNC: 89 U/L (ref 38–126)
ALT SERPL W/O P-5'-P-CCNC: 13 U/L (ref 11–66)
AST SERPL-CCNC: 23 U/L (ref 5–40)
BASOPHILS # BLD AUTO: 0 THOU/MM3 (ref 0–0.1)
BASOPHILS NFR BLD AUTO: 0 % (ref 0–3)
BILIRUB CONJ SERPL-MCNC: < 0.2 MG/DL (ref 0–0.3)
BILIRUB SERPL-MCNC: 0.5 MG/DL (ref 0.3–1.2)
BUN BLDP-MCNC: 11 MG/DL (ref 8–26)
CHLORIDE BLD-SCNC: 107 MEQ/L (ref 98–109)
CREAT BLD-MCNC: 0.7 MG/DL (ref 0.5–1.2)
EOSINOPHIL # BLD AUTO: 0.3 THOU/MM3 (ref 0–0.4)
EOSINOPHIL NFR BLD AUTO: 4 % (ref 0–4)
ERYTHROCYTE [DISTWIDTH] IN BLOOD BY AUTOMATED COUNT: 14.2 % (ref 11.5–14.5)
GFR SERPL CREATININE-BSD FRML MDRD: > 60 ML/MIN/1.73M2
GLUCOSE BLD-MCNC: 84 MG/DL (ref 70–108)
HCT VFR BLD AUTO: 35.2 % (ref 37–47)
HGB BLD-MCNC: 11.5 GM/DL (ref 12–16)
IMM GRANULOCYTES # BLD AUTO: 0.01 THOU/MM3 (ref 0–0.07)
IMM GRANULOCYTES NFR BLD AUTO: 0 %
IONIZED CALCIUM, WHOLE BLOOD: 1.15 MMOL/L (ref 1.12–1.32)
LYMPHOCYTES # BLD AUTO: 2.7 THOU/MM3 (ref 1–4.8)
LYMPHOCYTES NFR BLD AUTO: 39 % (ref 15–47)
MCH RBC QN AUTO: 33 PG (ref 26–33)
MCHC RBC AUTO-ENTMCNC: 32.7 GM/DL (ref 32.2–35.5)
MCV RBC AUTO: 101 FL (ref 81–99)
MONOCYTES # BLD AUTO: 0.8 THOU/MM3 (ref 0.4–1.3)
MONOCYTES NFR BLD AUTO: 11 % (ref 0–12)
NEUTROPHILS NFR BLD AUTO: 46 % (ref 43–75)
PLATELET # BLD AUTO: 265 THOU/MM3 (ref 130–400)
PMV BLD AUTO: 8.5 FL (ref 9.4–12.4)
POTASSIUM BLD-SCNC: 3.5 MEQ/L (ref 3.5–4.9)
PROT SERPL-MCNC: 7.3 G/DL (ref 6.1–8)
RBC # BLD AUTO: 3.49 MILL/MM3 (ref 4.2–5.4)
SEGMENTED NEUTROPHILS ABSOLUTE COUNT: 3.2 THOU/MM3 (ref 1.8–7.7)
SODIUM BLD-SCNC: 141 MEQ/L (ref 138–146)
TOTAL CO2, WHOLE BLOOD: 24 MEQ/L (ref 23–33)
WBC # BLD AUTO: 6.9 THOU/MM3 (ref 4.8–10.8)

## 2023-08-01 PROCEDURE — 82728 ASSAY OF FERRITIN: CPT

## 2023-08-01 PROCEDURE — G8417 CALC BMI ABV UP PARAM F/U: HCPCS | Performed by: NURSE PRACTITIONER

## 2023-08-01 PROCEDURE — 83550 IRON BINDING TEST: CPT

## 2023-08-01 PROCEDURE — G8400 PT W/DXA NO RESULTS DOC: HCPCS | Performed by: NURSE PRACTITIONER

## 2023-08-01 PROCEDURE — 99211 OFF/OP EST MAY X REQ PHY/QHP: CPT

## 2023-08-01 PROCEDURE — 1123F ACP DISCUSS/DSCN MKR DOCD: CPT | Performed by: NURSE PRACTITIONER

## 2023-08-01 PROCEDURE — 80076 HEPATIC FUNCTION PANEL: CPT

## 2023-08-01 PROCEDURE — 1036F TOBACCO NON-USER: CPT | Performed by: NURSE PRACTITIONER

## 2023-08-01 PROCEDURE — G8427 DOCREV CUR MEDS BY ELIG CLIN: HCPCS | Performed by: NURSE PRACTITIONER

## 2023-08-01 PROCEDURE — 80047 BASIC METABLC PNL IONIZED CA: CPT

## 2023-08-01 PROCEDURE — 83540 ASSAY OF IRON: CPT

## 2023-08-01 PROCEDURE — 1090F PRES/ABSN URINE INCON ASSESS: CPT | Performed by: NURSE PRACTITIONER

## 2023-08-01 PROCEDURE — 85025 COMPLETE CBC W/AUTO DIFF WBC: CPT

## 2023-08-01 PROCEDURE — 99213 OFFICE O/P EST LOW 20 MIN: CPT | Performed by: NURSE PRACTITIONER

## 2023-08-01 PROCEDURE — 86300 IMMUNOASSAY TUMOR CA 15-3: CPT

## 2023-08-01 NOTE — PROGRESS NOTES
[address] [facility] [address]  Dept: [phone]  Loc: [phone]       Visit Date:8/1/2023     Robert Lehman is a 65 y.o. female who presents today for:   Chief Complaint   Patient presents with    Follow-up     Malignant neoplasm of lower-outer quadrant of left breast of female, estrogen receptor positive ([address])        HPI:   Robert Lehman is a 65 y.o. female with breast cancer. The patient has a history of anxiety, vertigo, type 2 diabetes, hyperlipidemia, GERD, diverticulitis and chronic back pain. 05/24/2019 ultrasound guided right breast biopsy revealed no evidence of malignancy. Biopsy of the left upper outer quadrant was benign, but a second biopsy in the left lower outer quadrant showed invasive ductal carcinoma, [place] grade 2, ER positive, [place] positive, Ki-67 5% and HER2/steven not amplified. 06/03/2019 the patient was seen for initial evaluation with Dr. Beth Ortez. Surgical options were discussed. 06/12/2019 the patient underwent a left breast lumpectomy with sentinel node evaluation. Surgical pathology results confirmed invasive ductal carcinoma, 1.1 cm in size, all margins negative for malignancy, sentinel lymph node negative for malignancy. Oncotype DX score 19    07/03/2019 the patient was seen for initial evaluation with Dr. Iman Mobley. Chemotherapy was not recommended in view of the low risk of recurrence. The patient was recommended treatment with antiestrogen therapy. The patient agreed to proceed with Arimidex 1 mg by mouth daily. 10/09/2019 the patient was seen for follow-up with Dr. Iman Mobley. It was noted she discontinued treatment with Arimidex due to intolerance and elected to remain on a pattern of surveillance. 11/09/2021 the patient underwent colonoscopy with Dr. Treasure Alva. No evidence of high-grade dysplasia or malignancy.     07/08/2023 screening mammogram results

## 2023-08-01 NOTE — PATIENT INSTRUCTIONS
Iron studies today, will call for treatment plan  Return to clinic for follow up in 3 months  Notify the office of any new or worsening symptoms

## 2023-08-02 LAB
FERRITIN SERPL IA-MCNC: 225 NG/ML (ref 10–291)
IRON SATN MFR SERPL: 33 % (ref 20–50)
IRON SERPL-MCNC: 83 UG/DL (ref 50–170)
TIBC SERPL-MCNC: 251 UG/DL (ref 171–450)

## 2023-08-04 LAB — CANCER AG27-29 SERPL-ACNC: 31 U/ML (ref 0–38)

## 2023-11-29 ENCOUNTER — HOSPITAL ENCOUNTER (OUTPATIENT)
Dept: INFUSION THERAPY | Age: 78
Discharge: HOME | End: 2023-11-29
Payer: MEDICARE

## 2023-11-29 ENCOUNTER — OFFICE VISIT (OUTPATIENT)
Dept: ONCOLOGY | Age: 78
End: 2023-11-29
Payer: MEDICARE

## 2023-11-29 VITALS
OXYGEN SATURATION: 96 % | SYSTOLIC BLOOD PRESSURE: 158 MMHG | HEIGHT: 62 IN | RESPIRATION RATE: 20 BRPM | HEART RATE: 94 BPM | WEIGHT: 188 LBS | BODY MASS INDEX: 34.6 KG/M2 | DIASTOLIC BLOOD PRESSURE: 79 MMHG

## 2023-11-29 VITALS
HEART RATE: 94 BPM | WEIGHT: 188 LBS | DIASTOLIC BLOOD PRESSURE: 79 MMHG | SYSTOLIC BLOOD PRESSURE: 158 MMHG | BODY MASS INDEX: 34.6 KG/M2 | RESPIRATION RATE: 20 BRPM | OXYGEN SATURATION: 96 % | HEIGHT: 62 IN

## 2023-11-29 DIAGNOSIS — C50.512 MALIGNANT NEOPLASM OF LOWER-OUTER QUADRANT OF LEFT BREAST OF FEMALE, ESTROGEN RECEPTOR POSITIVE (HCC): Primary | ICD-10-CM

## 2023-11-29 DIAGNOSIS — Z17.0 MALIGNANT NEOPLASM OF LOWER-OUTER QUADRANT OF LEFT BREAST OF FEMALE, ESTROGEN RECEPTOR POSITIVE (HCC): Primary | ICD-10-CM

## 2023-11-29 DIAGNOSIS — D64.9 CHRONIC ANEMIA: ICD-10-CM

## 2023-11-29 PROCEDURE — G8400 PT W/DXA NO RESULTS DOC: HCPCS | Performed by: NURSE PRACTITIONER

## 2023-11-29 PROCEDURE — 99211 OFF/OP EST MAY X REQ PHY/QHP: CPT

## 2023-11-29 PROCEDURE — G8484 FLU IMMUNIZE NO ADMIN: HCPCS | Performed by: NURSE PRACTITIONER

## 2023-11-29 PROCEDURE — G8417 CALC BMI ABV UP PARAM F/U: HCPCS | Performed by: NURSE PRACTITIONER

## 2023-11-29 PROCEDURE — 1036F TOBACCO NON-USER: CPT | Performed by: NURSE PRACTITIONER

## 2023-11-29 PROCEDURE — 1123F ACP DISCUSS/DSCN MKR DOCD: CPT | Performed by: NURSE PRACTITIONER

## 2023-11-29 PROCEDURE — G8427 DOCREV CUR MEDS BY ELIG CLIN: HCPCS | Performed by: NURSE PRACTITIONER

## 2023-11-29 PROCEDURE — 1090F PRES/ABSN URINE INCON ASSESS: CPT | Performed by: NURSE PRACTITIONER

## 2023-11-29 PROCEDURE — 99214 OFFICE O/P EST MOD 30 MIN: CPT | Performed by: NURSE PRACTITIONER

## 2023-11-29 RX ORDER — SUCRALFATE 1 G/1
1 TABLET ORAL 2 TIMES DAILY
COMMUNITY

## 2023-11-29 RX ORDER — DILTIAZEM HYDROCHLORIDE 240 MG/1
240 CAPSULE, EXTENDED RELEASE ORAL DAILY
COMMUNITY

## 2023-11-29 NOTE — PROGRESS NOTES
female, estrogen receptor positive ([address])  05/24/2019 ultrasound guided right breast biopsy revealed no evidence of malignancy. Biopsy of the left upper outer quadrant was benign, but a second biopsy in the left lower outer quadrant showed invasive ductal carcinoma, [place] grade 2, ER positive, VT positive, Ki-67 5% and HER2/steven not amplified. 06/12/2019 left breast lumpectomy, Invasive ductal carcinoma, 1.1 cm in size, all margins negative for malignancy, sentinel lymph node negative for malignancy. Oncotype DX score 19     07/03/2019 agreed to proceed with Arimidex 1 mg by mouth daily, but discontinued due to intolerance     Continue a pattern of surveillance    - CBC with Auto Differential; Future  - Hepatic Function Panel; Future  - POC PANEL BMP W/IOCA; Future    2. Chronic anemia  - Ferritin; Future  - Iron; Future  - IRON SATURATION; Future  - Iron Binding Capacity; Future  - Vitamin B12; Future  - Folate; Future    Return in about 13 weeks (around 2/28/2024). 35 minutes on chart prep, review of labs and face-to-face with the patient. Greater than 50% of time was spent on counseling and coordinating care. All patient questions answered. Pt voiced understanding. Patient agreed with treatment plan. Follow up as directed. Patient instructed to call for questions or concerns.        Electronically signed by   DARA Qureshi CNP

## 2024-02-28 ENCOUNTER — HOSPITAL ENCOUNTER (OUTPATIENT)
Dept: INFUSION THERAPY | Age: 79
Discharge: HOME | End: 2024-02-28
Payer: MEDICARE

## 2024-02-28 ENCOUNTER — OFFICE VISIT (OUTPATIENT)
Dept: ONCOLOGY | Age: 79
End: 2024-02-28
Payer: MEDICARE

## 2024-02-28 VITALS
TEMPERATURE: 96.8 F | BODY MASS INDEX: 32.42 KG/M2 | HEART RATE: 81 BPM | DIASTOLIC BLOOD PRESSURE: 82 MMHG | WEIGHT: 176.2 LBS | HEIGHT: 62 IN | SYSTOLIC BLOOD PRESSURE: 148 MMHG | RESPIRATION RATE: 16 BRPM | OXYGEN SATURATION: 96 %

## 2024-02-28 VITALS
HEART RATE: 81 BPM | DIASTOLIC BLOOD PRESSURE: 82 MMHG | OXYGEN SATURATION: 96 % | SYSTOLIC BLOOD PRESSURE: 148 MMHG | TEMPERATURE: 96.8 F | RESPIRATION RATE: 16 BRPM

## 2024-02-28 DIAGNOSIS — D64.9 CHRONIC ANEMIA: ICD-10-CM

## 2024-02-28 DIAGNOSIS — C50.512 MALIGNANT NEOPLASM OF LOWER-OUTER QUADRANT OF LEFT BREAST OF FEMALE, ESTROGEN RECEPTOR POSITIVE (HCC): ICD-10-CM

## 2024-02-28 DIAGNOSIS — Z17.0 MALIGNANT NEOPLASM OF LOWER-OUTER QUADRANT OF LEFT BREAST OF FEMALE, ESTROGEN RECEPTOR POSITIVE (HCC): Primary | ICD-10-CM

## 2024-02-28 DIAGNOSIS — Z17.0 MALIGNANT NEOPLASM OF LOWER-OUTER QUADRANT OF LEFT BREAST OF FEMALE, ESTROGEN RECEPTOR POSITIVE (HCC): ICD-10-CM

## 2024-02-28 DIAGNOSIS — C50.512 MALIGNANT NEOPLASM OF LOWER-OUTER QUADRANT OF LEFT BREAST OF FEMALE, ESTROGEN RECEPTOR POSITIVE (HCC): Primary | ICD-10-CM

## 2024-02-28 LAB
BASOPHILS # BLD AUTO: 0 THOU/MM3 (ref 0–0.1)
BASOPHILS NFR BLD AUTO: 0 % (ref 0–3)
BUN BLDP-MCNC: 8 MG/DL (ref 8–26)
CHLORIDE BLD-SCNC: 109 MEQ/L (ref 98–109)
CREAT BLD-MCNC: 0.6 MG/DL (ref 0.5–1.2)
EOSINOPHIL # BLD AUTO: 0.2 THOU/MM3 (ref 0–0.4)
EOSINOPHIL NFR BLD AUTO: 4 % (ref 0–4)
ERYTHROCYTE [DISTWIDTH] IN BLOOD BY AUTOMATED COUNT: 14.6 % (ref 11.5–14.5)
GFR SERPL CREATININE-BSD FRML MDRD: > 60 ML/MIN/1.73M2
GLUCOSE BLD-MCNC: 118 MG/DL (ref 70–108)
HCT VFR BLD AUTO: 37 % (ref 37–47)
HGB BLD-MCNC: 11.9 GM/DL (ref 12–16)
IMM GRANULOCYTES # BLD AUTO: 0.01 THOU/MM3 (ref 0–0.07)
IMM GRANULOCYTES NFR BLD AUTO: 0 %
IONIZED CALCIUM, WHOLE BLOOD: 1.23 MMOL/L (ref 1.12–1.32)
LYMPHOCYTES # BLD AUTO: 2 THOU/MM3 (ref 1–4.8)
LYMPHOCYTES NFR BLD AUTO: 35 % (ref 15–47)
MCH RBC QN AUTO: 31.2 PG (ref 26–33)
MCHC RBC AUTO-ENTMCNC: 32.2 GM/DL (ref 32.2–35.5)
MCV RBC AUTO: 97 FL (ref 81–99)
MONOCYTES # BLD AUTO: 0.6 THOU/MM3 (ref 0.4–1.3)
MONOCYTES NFR BLD AUTO: 11 % (ref 0–12)
NEUTROPHILS NFR BLD AUTO: 50 % (ref 43–75)
PLATELET # BLD AUTO: 267 THOU/MM3 (ref 130–400)
PMV BLD AUTO: 8.4 FL (ref 9.4–12.4)
POTASSIUM BLD-SCNC: 3.5 MEQ/L (ref 3.5–4.9)
RBC # BLD AUTO: 3.81 MILL/MM3 (ref 4.2–5.4)
SEGMENTED NEUTROPHILS ABSOLUTE COUNT: 2.9 THOU/MM3 (ref 1.8–7.7)
SODIUM BLD-SCNC: 143 MEQ/L (ref 138–146)
TOTAL CO2, WHOLE BLOOD: 26 MEQ/L (ref 23–33)
WBC # BLD AUTO: 5.8 THOU/MM3 (ref 4.8–10.8)

## 2024-02-28 PROCEDURE — G8417 CALC BMI ABV UP PARAM F/U: HCPCS | Performed by: NURSE PRACTITIONER

## 2024-02-28 PROCEDURE — 82607 VITAMIN B-12: CPT

## 2024-02-28 PROCEDURE — 99211 OFF/OP EST MAY X REQ PHY/QHP: CPT

## 2024-02-28 PROCEDURE — G8400 PT W/DXA NO RESULTS DOC: HCPCS | Performed by: NURSE PRACTITIONER

## 2024-02-28 PROCEDURE — 85025 COMPLETE CBC W/AUTO DIFF WBC: CPT

## 2024-02-28 PROCEDURE — G8484 FLU IMMUNIZE NO ADMIN: HCPCS | Performed by: NURSE PRACTITIONER

## 2024-02-28 PROCEDURE — 1036F TOBACCO NON-USER: CPT | Performed by: NURSE PRACTITIONER

## 2024-02-28 PROCEDURE — 1123F ACP DISCUSS/DSCN MKR DOCD: CPT | Performed by: NURSE PRACTITIONER

## 2024-02-28 PROCEDURE — 99214 OFFICE O/P EST MOD 30 MIN: CPT | Performed by: NURSE PRACTITIONER

## 2024-02-28 PROCEDURE — 82728 ASSAY OF FERRITIN: CPT

## 2024-02-28 PROCEDURE — 82746 ASSAY OF FOLIC ACID SERUM: CPT

## 2024-02-28 PROCEDURE — G8427 DOCREV CUR MEDS BY ELIG CLIN: HCPCS | Performed by: NURSE PRACTITIONER

## 2024-02-28 PROCEDURE — 83550 IRON BINDING TEST: CPT

## 2024-02-28 PROCEDURE — 1090F PRES/ABSN URINE INCON ASSESS: CPT | Performed by: NURSE PRACTITIONER

## 2024-02-28 PROCEDURE — 80047 BASIC METABLC PNL IONIZED CA: CPT

## 2024-02-28 PROCEDURE — 83540 ASSAY OF IRON: CPT

## 2024-02-28 PROCEDURE — 80076 HEPATIC FUNCTION PANEL: CPT

## 2024-02-28 RX ORDER — DABIGATRAN ETEXILATE 150 MG/1
1 CAPSULE ORAL 2 TIMES DAILY
COMMUNITY
Start: 2024-02-01

## 2024-02-28 NOTE — PROGRESS NOTES
[facility] [place] [facility]  [facility]  [address]  [address]  [facility] [address]  Dept: [phone]  Loc: [phone]       Visit Date:2/28/2024     Nga Washburn is a 78 y.o. female who presents today for:   Chief Complaint   Patient presents with    Follow-up     Malignant neoplasm of lower-outer quadrant of left breast of female, estrogen receptor positive (HCC)            HPI:   Nga Washburn is a 78 y.o. female with breast cancer.  The patient has a history of anxiety, vertigo, type 2 diabetes, hyperlipidemia, GERD, diverticulitis and chronic back pain.     05/24/2019 ultrasound guided right breast biopsy revealed no evidence of malignancy.  Biopsy of the left upper outer quadrant was benign, but a second biopsy in the left lower outer quadrant showed invasive ductal carcinoma, [place] grade 2, ER positive, NC positive, Ki-67 5% and HER2/steven not amplified.     06/03/2019 the patient was seen for initial evaluation with Dr. Polanco.  Surgical options were discussed.       06/12/2019 the patient underwent a left breast lumpectomy with sentinel node evaluation.  Surgical pathology results confirmed invasive ductal carcinoma, 1.1 cm in size, all margins negative for malignancy, sentinel lymph node negative for malignancy.  Oncotype DX score 19     07/03/2019 the patient was seen for initial evaluation with Dr. Man.  Chemotherapy was not recommended in view of the low risk of recurrence.  The patient was recommended treatment with antiestrogen therapy.  The patient agreed to proceed with Arimidex 1 mg by mouth daily.     10/09/2019 the patient was seen for follow-up with Dr. Man.  It was noted she discontinued treatment with Arimidex due to intolerance and elected to remain on a pattern of surveillance.     11/09/2021 the patient underwent colonoscopy with Dr. Garcia.  No evidence of high-grade dysplasia or malignancy.     03/30/2023 she underwent a

## 2024-02-29 LAB
ALBUMIN SERPL BCG-MCNC: 4 G/DL (ref 3.5–5.1)
ALP SERPL-CCNC: 91 U/L (ref 38–126)
ALT SERPL W/O P-5'-P-CCNC: 15 U/L (ref 11–66)
AST SERPL-CCNC: 20 U/L (ref 5–40)
BILIRUB CONJ SERPL-MCNC: < 0.2 MG/DL (ref 0–0.3)
BILIRUB SERPL-MCNC: 0.5 MG/DL (ref 0.3–1.2)
FERRITIN SERPL IA-MCNC: 178 NG/ML (ref 10–291)
FOLATE SERPL-MCNC: 13 NG/ML (ref 4.8–24.2)
IRON SATN MFR SERPL: 21 % (ref 20–50)
IRON SERPL-MCNC: 54 UG/DL (ref 50–170)
PROT SERPL-MCNC: 7.6 G/DL (ref 6.1–8)
TIBC SERPL-MCNC: 252 UG/DL (ref 171–450)
VIT B12 SERPL-MCNC: 412 PG/ML (ref 211–911)

## 2024-03-20 PROBLEM — I10 HYPERTENSION: Status: ACTIVE | Noted: 2024-03-20

## 2024-03-20 PROBLEM — D64.9 CHRONIC ANEMIA: Status: ACTIVE | Noted: 2024-03-20

## 2024-03-20 PROBLEM — R92.30 BREAST DENSITY: Status: ACTIVE | Noted: 2024-03-20

## 2024-03-20 PROBLEM — C50.919 MALIGNANT NEOPLASM OF FEMALE BREAST (HCC): Status: ACTIVE | Noted: 2024-03-20

## 2024-03-20 PROBLEM — E66.9 CLASS 1 OBESITY: Status: ACTIVE | Noted: 2024-03-20

## 2024-03-20 PROBLEM — K57.92 DIVERTICULITIS: Status: ACTIVE | Noted: 2024-03-20

## 2024-03-20 PROBLEM — K64.9 HEMORRHOIDS: Status: RESOLVED | Noted: 2024-03-20 | Resolved: 2024-03-20

## 2024-03-20 PROBLEM — K58.0 IRRITABLE BOWEL SYNDROME WITH DIARRHEA: Status: ACTIVE | Noted: 2024-03-20

## 2024-03-28 ENCOUNTER — OFFICE VISIT (OUTPATIENT)
Dept: SURGERY | Age: 79
End: 2024-03-28
Payer: MEDICARE

## 2024-03-28 VITALS
OXYGEN SATURATION: 98 % | SYSTOLIC BLOOD PRESSURE: 154 MMHG | HEART RATE: 50 BPM | HEIGHT: 62 IN | WEIGHT: 175.7 LBS | BODY MASS INDEX: 32.33 KG/M2 | TEMPERATURE: 97 F | DIASTOLIC BLOOD PRESSURE: 78 MMHG

## 2024-03-28 DIAGNOSIS — Z85.3 HISTORY OF LEFT BREAST CANCER: Primary | ICD-10-CM

## 2024-03-28 DIAGNOSIS — I10 ESSENTIAL HYPERTENSION: ICD-10-CM

## 2024-03-28 DIAGNOSIS — I48.91 ATRIAL FIBRILLATION, UNSPECIFIED TYPE (HCC): ICD-10-CM

## 2024-03-28 PROBLEM — E55.9 VITAMIN D DEFICIENCY: Status: ACTIVE | Noted: 2024-03-28

## 2024-03-28 PROBLEM — K21.9 GASTROESOPHAGEAL REFLUX DISEASE: Status: ACTIVE | Noted: 2024-03-28

## 2024-03-28 PROBLEM — E78.5 HYPERLIPIDEMIA: Status: ACTIVE | Noted: 2024-03-28

## 2024-03-28 PROCEDURE — 99214 OFFICE O/P EST MOD 30 MIN: CPT | Performed by: SURGERY

## 2024-03-28 PROCEDURE — G8427 DOCREV CUR MEDS BY ELIG CLIN: HCPCS | Performed by: SURGERY

## 2024-03-28 PROCEDURE — 1123F ACP DISCUSS/DSCN MKR DOCD: CPT | Performed by: SURGERY

## 2024-03-28 PROCEDURE — G8484 FLU IMMUNIZE NO ADMIN: HCPCS | Performed by: SURGERY

## 2024-03-28 PROCEDURE — 1036F TOBACCO NON-USER: CPT | Performed by: SURGERY

## 2024-03-28 PROCEDURE — 3077F SYST BP >= 140 MM HG: CPT | Performed by: SURGERY

## 2024-03-28 PROCEDURE — G8417 CALC BMI ABV UP PARAM F/U: HCPCS | Performed by: SURGERY

## 2024-03-28 PROCEDURE — 3078F DIAST BP <80 MM HG: CPT | Performed by: SURGERY

## 2024-03-28 PROCEDURE — 1090F PRES/ABSN URINE INCON ASSESS: CPT | Performed by: SURGERY

## 2024-03-28 PROCEDURE — G8400 PT W/DXA NO RESULTS DOC: HCPCS | Performed by: SURGERY

## 2024-03-28 ASSESSMENT — ENCOUNTER SYMPTOMS
NAUSEA: 0
WHEEZING: 0
EYE PAIN: 0
BLOOD IN STOOL: 0
EYE REDNESS: 0
COUGH: 0
SINUS PRESSURE: 0
ABDOMINAL PAIN: 0
BACK PAIN: 0
VOMITING: 0
DIARRHEA: 1
SORE THROAT: 0
SHORTNESS OF BREATH: 0
COLOR CHANGE: 0

## 2024-03-28 NOTE — PROGRESS NOTES
Eunice Polanco MD   General Surgery  Follow up Patient Evaluation in Office  Pt Name: Nga Washburn  Date of Birth 1945   Today's Date: 3/28/2024  Medical Record Number: [id number]  Referring Provider: No ref. provider found  Primary Care Provider: Julio Nickerson MD  Chief Complaint:  Chief Complaint   Patient presents with    1 Year Follow Up     History of left breast cancer. Breast cancer, stage 1, estrogen receptor positive, left-Last seen 3/30/23-Mammo 7/7/23-Follows Dr. Yoon       ASSESSMENT      1.  History of left breast cancer no evidence of recurrent disease  2.  Essential hypertension stable on current regimen  3.  Chronic atrial fibrillation on Pradaxa.    PLANS      1.  Clinical breast examination performed, benign.  2.  Screening mammography reviewed July 2023.  No mammographic evidence of malignancy.  Patient cannot tolerate MRI.  3.  Oncology records reviewed  4.  Encourage self breast examinations monthly.  Call for any changes.  5.  Yearly mammography  6.  Continue follow-up with medical oncology as well.  Oncology notes reviewed.   7.  Follow-up surgical clinic as needed.  SUBJECTIVE   History of present illness:  Nga is a 78 y.o. year old female who is presenting today in the office for follow-up of left breast cancer.  She had an abnormal screening mammogram in May 2019.  Abnormalities were seen in both breasts.  She underwent diagnostic imaging, ultrasound and biopsies of the areas of concern.  She had a invasive ductal carcinoma found in the lower outer quadrant of the left breast it was nuclear grade 2.  Was ER +100% VT +75% and HER-2/steven was not amplified.  Ultrasound imaging of the axilla revealed no suspicious findings.  Biopsies of additional areas were benign.  Nga underwent a left lumpectomy and sentinel lymph node biopsy.  Final pathology margins were negative.  Invasive cancer was 1.1 cm in size.  Closest margin was 5 mm to the skin.  Lymph nodes were negative

## 2024-03-29 ASSESSMENT — ENCOUNTER SYMPTOMS: CONSTIPATION: 1

## 2024-06-13 NOTE — ANESTHESIA PRE PROCEDURE
Department of Anesthesiology  Preprocedure Note       Name:  Geovanna Holden   Age:  75 y.o.  :  1945                                          MRN:  [id number]         Date:  2019      Surgeon: Olivia Avalos):  Natalie Sagastume MD    Procedure: LEFT BREAST LUMPECTOMY WITH PREOP NEEDLE LOC (Left )    Medications prior to admission:   Prior to Admission medications    Medication Sig Start Date End Date Taking? Authorizing Provider   amLODIPine (NORVASC) 5 MG tablet Take 5 mg by mouth daily   Yes Historical Provider, MD   azelastine (ASTELIN) 0.1 % nasal spray 1 spray by Nasal route 2 times daily Use in each nostril as directed   Yes Historical Provider, MD   famotidine (PEPCID) 20 MG tablet Take 20 mg by mouth 2 times daily   Yes Historical Provider, MD   fexofenadine (HM FEXOFENADINE HCL) 180 MG tablet Take 180 mg by mouth daily   Yes Historical Provider, MD   metoprolol succinate (TOPROL XL) 50 MG extended release tablet Take 50 mg by mouth daily   Yes Historical Provider, MD   Multiple Vitamins-Minerals (THERAPEUTIC MULTIVITAMIN-MINERALS) tablet Take 1 tablet by mouth daily   Yes Historical Provider, MD   montelukast (SINGULAIR) 10 MG tablet Take 10 mg by mouth nightly   Yes Historical Provider, MD       Current medications:    Current Facility-Administered Medications   Medication Dose Route Frequency Provider Last Rate Last Dose    0.9 % sodium chloride infusion   Intravenous Continuous Natalie Sagastume MD        sodium chloride flush 0.9 % injection 10 mL  10 mL Intravenous 2 times per day Natalie Sagastume MD        sodium chloride flush 0.9 % injection 10 mL  10 mL Intravenous PRN Natalie Sagastume MD        clindamycin (CLEOCIN) 900 mg in dextrose 5 % 50 mL IVPB  900 mg Intravenous On Call to [address] [place] [address], MD           Allergies:     Allergies   Allergen Reactions    Aspirin Hives    Ceftin [Cefuroxime Axetil] Rash    Codeine Rash    Nexium [Esomeprazole] Diarrhea    Penicillins Rash    Reglan Patient confirmed appointment.   [Metoclopramide] Diarrhea       Problem List:  There is no problem list on file for this patient. Past Medical History:        Diagnosis Date    Diverticulitis     GERD (gastroesophageal reflux disease)     Hypertension     IBS (irritable bowel syndrome)     Invasive ductal carcinoma of breast ([address]     left       Past Surgical History:        Procedure Laterality Date    [facility]23[facility]    [facility]  [facility]19/[facility]    [facility]   Cher Tyson in [address]    COLONOSCOPY  2017    Dr Lula Manriquez Bilateral [address] [facility]       Social History:    Social History     Tobacco Use    Smoking status: Never Smoker    Smokeless tobacco: Never Used   Substance Use Topics    Alcohol use: Yes     Comment: glass wine nightly                                Counseling given: Not Answered      Vital Signs (Current):   Vitals:    06/12/19 0820   BP: (!) 180/79   Pulse: 79   Resp: 16   Temp: 97.2 °F (36.2 °C)   TempSrc: Temporal   SpO2: 98%   Weight: 184 lb 9.6 oz (83.7 kg)   Height: 5' 1\" (1.549 m)                                              BP Readings from Last 3 Encounters:   06/12/19 (!) 180/79   06/03/19 124/86       NPO Status: Time of last liquid consumption: 2300                        Time of last solid consumption: 1900                        Date of last liquid consumption: 06/11/19                        Date of last solid food consumption: 06/11/19    BMI:   Wt Readings from Last 3 Encounters:   06/12/19 184 lb 9.6 oz (83.7 kg)   06/03/19 184 lb 6.4 oz (83.6 kg)     Body mass index is 34.88 kg/m².     CBC:   Lab Results   Component Value Date    WBC 6.2 06/03/2019    RBC 3.80 06/03/2019    HGB 13.0 06/03/2019    HCT 39.2 06/03/2019    .2 06/03/2019     06/03/2019       CMP: Lab Results   Component Value Date     06/03/2019    K 4.3 06/03/2019     06/03/2019    CO2 24 06/03/2019    BUN 12 06/03/2019    CREATININE 0.7 06/03/2019    LABGLOM 82 06/03/2019    GLUCOSE 111 06/03/2019    CALCIUM 9.5 06/03/2019       POC Tests: No results for input(s): POCGLU, POCNA, POCK, POCCL, POCBUN, POCHEMO, POCHCT in the last 72 hours. Coags: No results found for: PROTIME, INR, APTT    HCG (If Applicable): No results found for: PREGTESTUR, PREGSERUM, HCG, HCGQUANT     ABGs: No results found for: PHART, PO2ART, JML9LIU, IQD2EDT, BEART, [id number]     Type & Screen (If Applicable):  No results found for: [facility]    Anesthesia Evaluation  Patient summary reviewed no history of anesthetic complications:   Airway: Mallampati: II  TM distance: >3 FB   Neck ROM: full  Mouth opening: > = 3 FB Dental: normal exam         Pulmonary: breath sounds clear to auscultation      (-) asthma, recent URI and not a current smoker                          ROS comment: Environmental allergies   Cardiovascular:    (+) hypertension:,         Rhythm: regular  Rate: normal                    Neuro/Psych:   Negative Neuro/Psych ROS              GI/Hepatic/Renal:   (+) GERD:,          ROS comment: BMI 35, diverticulitis, IBS. Endo/Other:    (+) malignancy/cancer (left breast cancer). Abdominal:           Vascular: negative vascular ROS. Anesthesia Plan      MAC     ASA 2       Induction: intravenous. Anesthetic plan and risks discussed with patient and spouse. Plan discussed with CRNA.                   Imer Jones MD   6/12/2019

## (undated) DEVICE — CHLORAPREP 26ML ORANGE

## (undated) DEVICE — SHEET, T, LAPAROTOMY, STERILE: Brand: MEDLINE

## (undated) DEVICE — SURE SET SINGLE BASIN-LF: Brand: MEDLINE INDUSTRIES, INC.

## (undated) DEVICE — SKIN AFFIX SURG ADHESIVE 72/CS 0.55ML: Brand: MEDLINE

## (undated) DEVICE — GLOVE ORANGE PI 8   MSG9080

## (undated) DEVICE — TOWEL,OR,DSP,ST,BLUE,DLX,4/PK,20PK/CS: Brand: MEDLINE

## (undated) DEVICE — PACK,SET UP,NO DRAPES: Brand: MEDLINE

## (undated) DEVICE — GLOVE SURG SZ 7 L12IN FNGR THK94MIL TRNSLUC YEL LTX HYDRGEL

## (undated) DEVICE — SOLUTION IV 1000ML 0.9% SOD CHL PH 5 INJ USP VIAFLX PLAS

## (undated) DEVICE — GOWN,SIRUS,NONRNF,SETINSLV,XL,20/CS: Brand: MEDLINE

## (undated) DEVICE — YANKAUER,BULB TIP,W/O VENT,RIGID,STERILE: Brand: MEDLINE

## (undated) DEVICE — GLOVE SURG SZ 65 THK91MIL LTX FREE SYN POLYISOPRENE

## (undated) DEVICE — TUBING, SUCTION, 1/4" X 12', STRAIGHT: Brand: MEDLINE

## (undated) DEVICE — GOWN,SIRUS,NON REINFRCD,LARGE,SET IN SL: Brand: MEDLINE

## (undated) DEVICE — GAUZE,SPONGE,8"X4",12PLY,XRAY,STRL,LF: Brand: MEDLINE

## (undated) DEVICE — INTENDED FOR TISSUE SEPARATION, AND OTHER PROCEDURES THAT REQUIRE A SHARP SURGICAL BLADE TO PUNCTURE OR CUT.: Brand: BARD-PARKER ® CARBON RIB-BACK BLADES

## (undated) DEVICE — SYRINGE IRRIG 60ML SFT PLIABLE BLB EZ TO GRP 1 HND USE W/

## (undated) DEVICE — COUNTER NDL 40 COUNT HLD 70 FOAM BLK ADH W/ MAG

## (undated) DEVICE — ELECTROSURGICAL PENCIL BUTTON SWITCH E-Z CLEAN COATED BLADE ELECTRODE 10 FT (3 M) CORD HOLSTER: Brand: MEGADYNE

## (undated) DEVICE — SPECIMEN ORIENTATION CHARMS, SIX DISTINCTLY SHAPED STERILE 10MM CHARMS: Brand: MARGINMAP